# Patient Record
Sex: FEMALE | Race: WHITE | NOT HISPANIC OR LATINO | Employment: FULL TIME | URBAN - METROPOLITAN AREA
[De-identification: names, ages, dates, MRNs, and addresses within clinical notes are randomized per-mention and may not be internally consistent; named-entity substitution may affect disease eponyms.]

---

## 2018-09-14 ENCOUNTER — OFFICE VISIT - HEALTHEAST (OUTPATIENT)
Dept: FAMILY MEDICINE | Facility: CLINIC | Age: 53
End: 2018-09-14

## 2018-09-14 ENCOUNTER — COMMUNICATION - HEALTHEAST (OUTPATIENT)
Dept: SCHEDULING | Facility: CLINIC | Age: 53
End: 2018-09-14

## 2018-09-14 DIAGNOSIS — A08.4 VIRAL GASTROENTERITIS: ICD-10-CM

## 2018-09-14 DIAGNOSIS — K62.5 ANAL BLEEDING: ICD-10-CM

## 2018-09-14 DIAGNOSIS — A60.00 GENITAL HERPES: ICD-10-CM

## 2018-09-14 DIAGNOSIS — R10.13 ABDOMINAL PAIN, EPIGASTRIC: ICD-10-CM

## 2021-06-01 VITALS — WEIGHT: 133 LBS

## 2021-06-02 ENCOUNTER — RECORDS - HEALTHEAST (OUTPATIENT)
Dept: ADMINISTRATIVE | Facility: CLINIC | Age: 56
End: 2021-06-02

## 2021-06-20 NOTE — PROGRESS NOTES
Assessment:     1. Anal bleeding     2. Abdominal pain, epigastric  C. difficile Toxigenic by PCR    H. pylori Antigen, Stool    Culture, Stool   3. Genital herpes  acyclovir (ZOVIRAX) 400 MG tablet   4. Viral gastroenteritis            Plan:     Differential diagnosis include but not limited to bleeding, hemorrhoids, abdominal pain, viral gastroenteritis, genital herpes.  Discussed with the patient about internal bleeding, there is a possibility that she does have internal bleeding hemorrhoids.  Currently I am not concerned with this because the last time she went to the bathroom she did not notice any rectal bleeding.  Advised patient to keep an eye monitor.  Patient declined a rectal exam at this time she will monitor and if she continues to experience symptoms she was scheduled an appointment to follow-up with PCP.  For the abdominal pain and epigastric pain I suspect viral gastroenteritis.  I discussed with the patient to increase fluid intake.  If this is something that is viral she should expect it to subside within the next 24-48 hours.  She also has a stool keep to collect stool culture and return to clinic.  Patient also requesting for a prophylactic medication for genital herpes which is a cycloplegia, I did refill patient medication ×1 month.  She will follow-up with PCP the next available appointment.  Patient verbalized understanding the plan of care.    Subjective:       53 y.o. female presents for evaluation of abdominal pain and cramping that started about midnight last night.  Patient reports that the cramping stat in the mid epigastric and radiates to the back.  She also reports that chills and sometimes she feels very hard and when the cramps subsides the chills subsides also.  She also noticed some blood in the stool a few times this morning but this afternoon when she went to the bathroom she did not notice any blood.  She denies any history of rectal bleeding, she reports that the blood was  bright red.  She reports that she had a colonoscopy in January 2018 and it showed internal a hemorrhoids.  Other than that they have never bothered her.    The following portions of the patient's history were reviewed and updated as appropriate: allergies, current medications, past family history, past medical history, past social history, past surgical history and problem list.    Review of Systems  A 12 point comprehensive review of systems was negative except as noted.     Objective:      /84  Temp 98  F (36.7  C) (Oral)   Wt 133 lb (60.3 kg)  General appearance: alert, appears stated age, cooperative and mild distress  Head: Normocephalic, without obvious abnormality, atraumatic, sinuses nontender to percussion  Eyes: conjunctivae/corneas clear. PERRL, EOM's intact. Fundi benign.  Ears: normal TM's and external ear canals both ears  Nose: Nares normal. Septum midline. Mucosa normal. No drainage or sinus tenderness.  Throat: lips, mucosa, and tongue normal; teeth and gums normal  Lungs: clear to auscultation bilaterally  Heart: regular rate and rhythm, S1, S2 normal, no murmur, click, rub or gallop  Abdomen: abnormal findings:  moderate tenderness in the entire abdomen and pronounced in epigastric area  Extremities: extremities normal, atraumatic, no cyanosis or edema  Pulses: 2+ and symmetric  Skin: Skin color, texture, turgor normal. No rashes or lesions  Lymph nodes: Cervical, supraclavicular, and axillary nodes normal.  Neurologic: Grossly normal     This note has been dictated using voice recognition software. Any grammatical or context distortions are unintentional and inherent to the software

## 2021-07-21 ENCOUNTER — RECORDS - HEALTHEAST (OUTPATIENT)
Dept: ADMINISTRATIVE | Facility: CLINIC | Age: 56
End: 2021-07-21

## 2022-08-04 ENCOUNTER — ALLIED HEALTH/NURSE VISIT (OUTPATIENT)
Dept: FAMILY MEDICINE | Facility: CLINIC | Age: 57
End: 2022-08-04
Payer: COMMERCIAL

## 2022-08-04 VITALS — HEART RATE: 88 BPM | DIASTOLIC BLOOD PRESSURE: 60 MMHG | SYSTOLIC BLOOD PRESSURE: 97 MMHG | OXYGEN SATURATION: 97 %

## 2022-08-04 DIAGNOSIS — Z01.30 BLOOD PRESSURE CHECK: ICD-10-CM

## 2022-08-04 DIAGNOSIS — R09.89 CHEST CONGESTION: Primary | ICD-10-CM

## 2022-08-04 PROCEDURE — 99207 PR NO CHARGE NURSE ONLY: CPT

## 2022-08-04 NOTE — PROGRESS NOTES
Pt presented to the clinic c/o chest congestion.  She states that in the past she has had pneumonia and her sx feel the same.  She would like to be seen.  Informed pt that we are not an Urgent Care and there are no appts available today.  There are no available appts tomorrow either but pt is instructed to call in the morning to see if there is something available at that time.  Pt voiced understanding and also voiced understanding that if she starts to feel worse that she should seek care at the ER.

## 2022-08-05 ENCOUNTER — OFFICE VISIT (OUTPATIENT)
Dept: FAMILY MEDICINE | Facility: CLINIC | Age: 57
End: 2022-08-05
Payer: COMMERCIAL

## 2022-08-05 VITALS
SYSTOLIC BLOOD PRESSURE: 108 MMHG | DIASTOLIC BLOOD PRESSURE: 72 MMHG | WEIGHT: 131 LBS | HEART RATE: 90 BPM | TEMPERATURE: 97.8 F | OXYGEN SATURATION: 96 %

## 2022-08-05 DIAGNOSIS — J15.9 BACTERIAL PNEUMONIA: Primary | ICD-10-CM

## 2022-08-05 DIAGNOSIS — Z13.220 SCREENING CHOLESTEROL LEVEL: ICD-10-CM

## 2022-08-05 DIAGNOSIS — F17.219 CIGARETTE NICOTINE DEPENDENCE WITH NICOTINE-INDUCED DISORDER: ICD-10-CM

## 2022-08-05 PROCEDURE — 99213 OFFICE O/P EST LOW 20 MIN: CPT | Performed by: FAMILY MEDICINE

## 2022-08-05 RX ORDER — LEVOFLOXACIN 500 MG/1
500 TABLET, FILM COATED ORAL DAILY
Qty: 10 TABLET | Refills: 0 | Status: ON HOLD | OUTPATIENT
Start: 2022-08-05 | End: 2022-08-15

## 2022-08-05 NOTE — PROGRESS NOTES
Assessment & Plan     Bacterial pneumonia  Patient with findings consistent with recurrence of her pneumonia.  She is due for follow-up CAT scan for the previous CT.  We will get sputum cultures on her and started on Levaquin.  Follow-up after the CT is done sooner if worse in any way  - levofloxacin (LEVAQUIN) 500 MG tablet; Take 1 tablet (500 mg) by mouth daily for 10 days  - CT Chest w/o Contrast; Future  - Respiratory Aerobic Bacterial Culture; Future    Cigarette nicotine dependence with nicotine-induced disorder  We discussed Chantix and its use.  She needs a follow-up after CAT scan done and will look at prescribing it at that time.    Screening cholesterol level                     No follow-ups on file.    Kota Mitchell MD  [facility]    Ham Delaney is a 56 year old, presenting for the following health issues: Patient notes she has recurrence of her symptoms back again she notes over the last few days she started having a cough productive of foul sputum no bloody sputum she has had a pleuritic right sided chest pain which is why she presented when she had a pneumonia few months ago.  She is due for follow-up CAT scan.  She is a smoker.  She does desire to quit.  She has had no fevers chills or sweats no increased shortness of breath no lightheadedness dizziness no cold symptoms otherwise.  URI      URI    History of Present Illness       Reason for visit:  Pain in right side  Symptom onset:  1-3 days ago    She eats 2-3 servings of fruits and vegetables daily.She consumes 0 sweetened beverage(s) daily.She exercises with enough effort to increase her heart rate 9 or less minutes per day.  She exercises with enough effort to increase her heart rate 3 or less days per week.   She is taking medications regularly.     Last dx with pneumonia in May. Sx did resolve. No fevers.     Discuss rx for Chantix.  EXAM: CT ANGIO CHEST W IV CONT PE STUDY   LOCATION: [facility]   DATE/TIME: 5/13/2022 2:47 PM     INDICATION: Hemoptysis; tachycardia; cough; elevated d-dimer   COMPARISON: None.   TECHNIQUE: CT chest pulmonary angiogram during arterial phase injection of IV contrast. Multiplanar reformats and MIP reconstructions were performed. Dose reduction techniques were used.   CONTRAST: IOHEXOL 350 MG/ML IV SOLN 80 mL     FINDINGS:   ANGIOGRAM CHEST: Pulmonary arteries are normal caliber and negative for pulmonary emboli. Thoracic aorta is negative for dissection. No CT evidence of right heart strain.     LUNGS AND PLEURA: Multiple consolidations in the lungs and mostly in the lower lobes. These have an infectious or inflammatory appearance. Septic emboli could be considered, however these do not have the typical appearance. No pleural effusions. Mild apical predominant emphysema.     MEDIASTINUM/AXILLAE: Mediastinal and bilateral hilar lymphadenopathy suggests reactive changes. Normal heart size.     CORONARY ARTERY CALCIFICATION: None.     UPPER ABDOMEN: Normal.     MUSCULOSKELETAL: Normal.     IMPRESSION:   1.  Negative for pulmonary emboli.   2.  Multiple small consolidations in both lungs suggesting a multifocal pneumonia. Septic emboli should be considered, however no cavitary lesions are evident. Atypical infections should also be considered such as fungal. Recommend a follow-up CT in one month to reevaluate.   3.  Mediastinal and bilateral hilar lymphadenopathy.    Procedure Note    Charles Garcia MD - 05/13/2022   Formatting of this note might be different from the original.   EXAM: CT ANGIO CHEST W IV CONT PE STUDY   LOCATION: [facility]   DATE/TIME: 5/13/2022 2:47 PM     INDICATION: Hemoptysis; tachycardia; cough; elevated d-dimer   COMPARISON: None.   TECHNIQUE: CT chest pulmonary angiogram during arterial phase injection of IV contrast. Multiplanar reformats and MIP reconstructions were performed. Dose reduction techniques were used.    CONTRAST: IOHEXOL 350 MG/ML IV SOLN 80 mL     FINDINGS:   ANGIOGRAM CHEST: Pulmonary arteries are normal caliber and negative for pulmonary emboli. Thoracic aorta is negative for dissection. No CT evidence of right heart strain.     LUNGS AND PLEURA: Multiple consolidations in the lungs and mostly in the lower lobes. These have an infectious or inflammatory appearance. Septic emboli could be considered, however these do not have the typical appearance. No pleural effusions. Mild apical predominant emphysema.     MEDIASTINUM/AXILLAE: Mediastinal and bilateral hilar lymphadenopathy suggests reactive changes. Normal heart size.     CORONARY ARTERY CALCIFICATION: None.     UPPER ABDOMEN: Normal.     MUSCULOSKELETAL: Normal.     IMPRESSION:   1.  Negative for pulmonary emboli.   2.  Multiple small consolidations in both lungs suggesting a multifocal pneumonia. Septic emboli should be considered, however no cavitary lesions are evident. Atypical infections should also be considered such as fungal. Recommend a follow-up CT in one month to reevaluate.   3.  Mediastinal and bilateral hilar lymphadenopathy.      Review of Systems   Constitutional, HEENT, cardiovascular, pulmonary, gi and gu systems are negative, except as otherwise noted.      Objective    /72 (BP Location: Right arm, Patient Position: Sitting, Cuff Size: Adult Regular)   Pulse 90   Temp 97.8  F (36.6  C) (Tympanic)   Wt 59.4 kg (131 lb)   SpO2 96%   There is no height or weight on file to calculate BMI.  Physical Exam   GENERAL: healthy, alert and no distress  NECK: no adenopathy, no asymmetry, masses, or scars and thyroid normal to palpation  RESP: She has right basilar and mid field rales  CV: regular rate and rhythm, normal S1 S2, no S3 or S4, no murmur, click or rub, no peripheral edema and peripheral pulses strong  ABDOMEN: soft, nontender, no hepatosplenomegaly, no masses and bowel sounds normal  MS: no gross musculoskeletal defects noted,  no edema                    .  ..

## 2022-08-08 ENCOUNTER — HOSPITAL ENCOUNTER (OUTPATIENT)
Dept: CT IMAGING | Facility: CLINIC | Age: 57
Discharge: HOME | End: 2022-08-08
Attending: FAMILY MEDICINE | Admitting: FAMILY MEDICINE
Payer: COMMERCIAL

## 2022-08-08 DIAGNOSIS — J15.9 BACTERIAL PNEUMONIA: ICD-10-CM

## 2022-08-08 PROCEDURE — 71250 CT THORAX DX C-: CPT

## 2022-08-09 ENCOUNTER — TELEPHONE (OUTPATIENT)
Dept: FAMILY MEDICINE | Facility: CLINIC | Age: 57
End: 2022-08-09

## 2022-08-09 DIAGNOSIS — J15.9 BACTERIAL PNEUMONIA: Primary | ICD-10-CM

## 2022-08-09 NOTE — TELEPHONE ENCOUNTER
----- Message from Kota Mitchell MD sent at 8/8/2022 11:11 AM CDT -----  Let her know ct shows pneumonia and possible fluid collection  Needs referral to pulmonary med for recurrent pneumonia and empyema Hopefully within a few weeks

## 2022-08-10 ENCOUNTER — HOSPITAL ENCOUNTER (INPATIENT)
Facility: HOSPITAL | Age: 57
LOS: 5 days | Discharge: HOME | DRG: 163 | End: 2022-08-15
Attending: EMERGENCY MEDICINE | Admitting: INTERNAL MEDICINE
Payer: COMMERCIAL

## 2022-08-10 ENCOUNTER — TELEPHONE (OUTPATIENT)
Dept: FAMILY MEDICINE | Facility: CLINIC | Age: 57
End: 2022-08-10

## 2022-08-10 DIAGNOSIS — J86.9 EMPYEMA (H): ICD-10-CM

## 2022-08-10 DIAGNOSIS — Z78.9 FAILURE OF OUTPATIENT TREATMENT: ICD-10-CM

## 2022-08-10 DIAGNOSIS — J18.9 HCAP (HEALTHCARE-ASSOCIATED PNEUMONIA): ICD-10-CM

## 2022-08-10 DIAGNOSIS — J18.9 PNEUMONIA OF RIGHT LOWER LOBE DUE TO INFECTIOUS ORGANISM: Primary | ICD-10-CM

## 2022-08-10 LAB
ANION GAP SERPL CALCULATED.3IONS-SCNC: 9 MMOL/L (ref 5–18)
BUN SERPL-MCNC: 7 MG/DL (ref 8–22)
CALCIUM SERPL-MCNC: 9.4 MG/DL (ref 8.5–10.5)
CHLORIDE BLD-SCNC: 97 MMOL/L (ref 98–107)
CO2 SERPL-SCNC: 30 MMOL/L (ref 22–31)
CREAT SERPL-MCNC: 0.63 MG/DL (ref 0.6–1.1)
ERYTHROCYTE [DISTWIDTH] IN BLOOD BY AUTOMATED COUNT: 14 % (ref 10–15)
GFR SERPL CREATININE-BSD FRML MDRD: >90 ML/MIN/1.73M2
GLUCOSE BLD-MCNC: 97 MG/DL (ref 70–125)
HCT VFR BLD AUTO: 38.8 % (ref 35–47)
HGB BLD-MCNC: 12.7 G/DL (ref 11.7–15.7)
LACTATE SERPL-SCNC: 1.3 MMOL/L (ref 0.7–2)
MAGNESIUM SERPL-MCNC: 1.9 MG/DL (ref 1.8–2.6)
MCH RBC QN AUTO: 28.7 PG (ref 26.5–33)
MCHC RBC AUTO-ENTMCNC: 32.7 G/DL (ref 31.5–36.5)
MCV RBC AUTO: 88 FL (ref 78–100)
PLATELET # BLD AUTO: 490 10E3/UL (ref 150–450)
POTASSIUM BLD-SCNC: 3.9 MMOL/L (ref 3.5–5)
RBC # BLD AUTO: 4.42 10E6/UL (ref 3.8–5.2)
SARS-COV-2 RNA RESP QL NAA+PROBE: NEGATIVE
SODIUM SERPL-SCNC: 136 MMOL/L (ref 136–145)
WBC # BLD AUTO: 15.9 10E3/UL (ref 4–11)

## 2022-08-10 PROCEDURE — 120N000001 HC R&B MED SURG/OB

## 2022-08-10 PROCEDURE — 99223 1ST HOSP IP/OBS HIGH 75: CPT | Performed by: INTERNAL MEDICINE

## 2022-08-10 PROCEDURE — 85027 COMPLETE CBC AUTOMATED: CPT | Performed by: STUDENT IN AN ORGANIZED HEALTH CARE EDUCATION/TRAINING PROGRAM

## 2022-08-10 PROCEDURE — U0005 INFEC AGEN DETEC AMPLI PROBE: HCPCS | Performed by: STUDENT IN AN ORGANIZED HEALTH CARE EDUCATION/TRAINING PROGRAM

## 2022-08-10 PROCEDURE — 96365 THER/PROPH/DIAG IV INF INIT: CPT

## 2022-08-10 PROCEDURE — 83605 ASSAY OF LACTIC ACID: CPT | Performed by: STUDENT IN AN ORGANIZED HEALTH CARE EDUCATION/TRAINING PROGRAM

## 2022-08-10 PROCEDURE — C9803 HOPD COVID-19 SPEC COLLECT: HCPCS

## 2022-08-10 PROCEDURE — 87040 BLOOD CULTURE FOR BACTERIA: CPT | Performed by: INTERNAL MEDICINE

## 2022-08-10 PROCEDURE — 999N000157 HC STATISTIC RCP TIME EA 10 MIN

## 2022-08-10 PROCEDURE — 250N000011 HC RX IP 250 OP 636: Performed by: NURSE PRACTITIONER

## 2022-08-10 PROCEDURE — 36415 COLL VENOUS BLD VENIPUNCTURE: CPT | Performed by: STUDENT IN AN ORGANIZED HEALTH CARE EDUCATION/TRAINING PROGRAM

## 2022-08-10 PROCEDURE — 87040 BLOOD CULTURE FOR BACTERIA: CPT | Performed by: STUDENT IN AN ORGANIZED HEALTH CARE EDUCATION/TRAINING PROGRAM

## 2022-08-10 PROCEDURE — 250N000011 HC RX IP 250 OP 636: Performed by: INTERNAL MEDICINE

## 2022-08-10 PROCEDURE — 99222 1ST HOSP IP/OBS MODERATE 55: CPT | Performed by: INTERNAL MEDICINE

## 2022-08-10 PROCEDURE — 94640 AIRWAY INHALATION TREATMENT: CPT

## 2022-08-10 PROCEDURE — 80048 BASIC METABOLIC PNL TOTAL CA: CPT | Performed by: STUDENT IN AN ORGANIZED HEALTH CARE EDUCATION/TRAINING PROGRAM

## 2022-08-10 PROCEDURE — 96367 TX/PROPH/DG ADDL SEQ IV INF: CPT

## 2022-08-10 PROCEDURE — 36415 COLL VENOUS BLD VENIPUNCTURE: CPT | Performed by: INTERNAL MEDICINE

## 2022-08-10 PROCEDURE — 83735 ASSAY OF MAGNESIUM: CPT | Performed by: INTERNAL MEDICINE

## 2022-08-10 PROCEDURE — 99285 EMERGENCY DEPT VISIT HI MDM: CPT | Mod: 25

## 2022-08-10 PROCEDURE — 250N000009 HC RX 250: Performed by: INTERNAL MEDICINE

## 2022-08-10 RX ORDER — SODIUM CHLORIDE FOR INHALATION 3 %
3 VIAL, NEBULIZER (ML) INHALATION ONCE
Status: COMPLETED | OUTPATIENT
Start: 2022-08-10 | End: 2022-08-10

## 2022-08-10 RX ORDER — VANCOMYCIN HYDROCHLORIDE 1 G/200ML
1000 INJECTION, SOLUTION INTRAVENOUS EVERY 12 HOURS
Status: DISCONTINUED | OUTPATIENT
Start: 2022-08-10 | End: 2022-08-12

## 2022-08-10 RX ORDER — NICOTINE 21 MG/24HR
1 PATCH, TRANSDERMAL 24 HOURS TRANSDERMAL DAILY PRN
Status: DISCONTINUED | OUTPATIENT
Start: 2022-08-10 | End: 2022-08-15 | Stop reason: HOSPADM

## 2022-08-10 RX ORDER — HEPARIN SODIUM 5000 [USP'U]/.5ML
5000 INJECTION, SOLUTION INTRAVENOUS; SUBCUTANEOUS EVERY 12 HOURS
Status: COMPLETED | OUTPATIENT
Start: 2022-08-11 | End: 2022-08-11

## 2022-08-10 RX ORDER — DOXYCYCLINE 100 MG/10ML
100 INJECTION, POWDER, LYOPHILIZED, FOR SOLUTION INTRAVENOUS ONCE
Status: COMPLETED | OUTPATIENT
Start: 2022-08-10 | End: 2022-08-10

## 2022-08-10 RX ORDER — LIDOCAINE 40 MG/G
CREAM TOPICAL
Status: DISCONTINUED | OUTPATIENT
Start: 2022-08-10 | End: 2022-08-13

## 2022-08-10 RX ORDER — ACETAMINOPHEN 325 MG/1
325 TABLET ORAL EVERY 4 HOURS PRN
Status: DISCONTINUED | OUTPATIENT
Start: 2022-08-10 | End: 2022-08-15 | Stop reason: HOSPADM

## 2022-08-10 RX ORDER — DOXYCYCLINE 100 MG/10ML
100 INJECTION, POWDER, LYOPHILIZED, FOR SOLUTION INTRAVENOUS EVERY 12 HOURS
Status: DISCONTINUED | OUTPATIENT
Start: 2022-08-11 | End: 2022-08-10

## 2022-08-10 RX ADMIN — DOXYCYCLINE 100 MG: 100 INJECTION, POWDER, LYOPHILIZED, FOR SOLUTION INTRAVENOUS at 14:36

## 2022-08-10 RX ADMIN — CEFEPIME 2 G: 2 INJECTION, POWDER, FOR SOLUTION INTRAVENOUS at 22:30

## 2022-08-10 RX ADMIN — SODIUM CHLORIDE SOLN NEBU 3% 3 ML: 3 NEBU SOLN at 20:04

## 2022-08-10 RX ADMIN — VANCOMYCIN HYDROCHLORIDE 1000 MG: 1 INJECTION, SOLUTION INTRAVENOUS at 18:38

## 2022-08-10 RX ADMIN — CEFEPIME HYDROCHLORIDE 2 G: 2 INJECTION, POWDER, FOR SOLUTION INTRAVENOUS at 13:55

## 2022-08-10 ASSESSMENT — ACTIVITIES OF DAILY LIVING (ADL)
CONCENTRATING,_REMEMBERING_OR_MAKING_DECISIONS_DIFFICULTY: NO
ADLS_ACUITY_SCORE: 20
ADLS_ACUITY_SCORE: 35
CHANGE_IN_FUNCTIONAL_STATUS_SINCE_ONSET_OF_CURRENT_ILLNESS/INJURY: NO
TOILETING_ISSUES: NO
ADLS_ACUITY_SCORE: 35
DOING_ERRANDS_INDEPENDENTLY_DIFFICULTY: NO
ADLS_ACUITY_SCORE: 20
WALKING_OR_CLIMBING_STAIRS_DIFFICULTY: NO
DRESSING/BATHING_DIFFICULTY: NO
FALL_HISTORY_WITHIN_LAST_SIX_MONTHS: NO
DIFFICULTY_EATING/SWALLOWING: NO
ADLS_ACUITY_SCORE: 33
ADLS_ACUITY_SCORE: 20
WEAR_GLASSES_OR_BLIND: YES
VISION_MANAGEMENT: GLASSES

## 2022-08-10 ASSESSMENT — ENCOUNTER SYMPTOMS
COUGH: 1
NAUSEA: 0
SHORTNESS OF BREATH: 0
DIARRHEA: 0
FEVER: 0
VOMITING: 0

## 2022-08-10 NOTE — ED NOTES
Emergency Department LATRICE Supervisory Note     I had a face to face encounter with this patient who was also seen by the LATRICE (PA / NP) who is currently serving as a LATRICE provider in the Emergency Department. I have seen, examined, and discussed the patient with the LATRICE and agree with their assessment and plan of management. Please refer to the LATRICE note for further details and ED course.     Brief HPI:     Elaina Charles is a 56 year old female who presents for evaluation of chest pain.    The patient has a history of recurring pneumonia. She last had pneumonia in May. She was given a course of antibiotics and felt better. Last Monday (8/1/2022, 9 days ago), the patient developed chest pain while taking deep breaths, which has persisted since. On Friday (8/5) and Saturday (8/6), the patient notes that she had a significant cough. Today, the patient has the same chest pain, but feels otherwise normal. No other complaints at this time.    I, Lucía Ludwig, am serving as a scribe to document services personally performed by Travis Thompson MD, based on my observations and the provider's statements to me. I, Travis Thompson MD, attest that Lucía Ludwig was acting in a scribe capacity, has observed my performance of the services and has documented them in accordance with my direction.    Brief Review of Systems:  Review of Systems   Respiratory: Positive for cough (resolved).    Cardiovascular: Positive for chest pain.   All other systems reviewed and are negative.         Brief Physical Exam:  Physical Exam  Vitals and nursing note reviewed.   HENT:      Head: Normocephalic.      Nose: Nose normal.   Cardiovascular:      Rate and Rhythm: Normal rate.   Pulmonary:      Effort: Pulmonary effort is normal.   Neurological:      Mental Status: She is alert. Mental status is at baseline.   Psychiatric:         Mood and Affect: Mood normal.         ED Course/MDM:  1:54 PM Elaina Charles was staffed with me. I agree with their assessment and  plan of management, and I will see the patient.  I met with the patient to introduce myself, gather additional history, perform my initial exam, and discuss the plan.     I did take the call on this patient.  She had a hospitalization back in May.  She developed pneumonia over the last few weeks and has failed a couple of courses of outpatient antibiotics.  Overall worsening, imaging today, Noncon CT shows empyema with some gas as well as evidence for active pneumonia.  She has a lot of antibiotic allergies and after consulting with pharmacy we will start her on cefepime and doxycycline.  Plan is for admission.  She is stable updated and in agreement           1. HCAP (healthcare-associated pneumonia)    2. Empyema (H)    3. Failure of outpatient treatment        Labs and Imaging:  Results for orders placed or performed during the hospital encounter of 08/10/22   CBC (+ platelets, no diff)   Result Value Ref Range    WBC Count 15.9 (H) 4.0 - 11.0 10e3/uL    RBC Count 4.42 3.80 - 5.20 10e6/uL    Hemoglobin 12.7 11.7 - 15.7 g/dL    Hematocrit 38.8 35.0 - 47.0 %    MCV 88 78 - 100 fL    MCH 28.7 26.5 - 33.0 pg    MCHC 32.7 31.5 - 36.5 g/dL    RDW 14.0 10.0 - 15.0 %    Platelet Count 490 (H) 150 - 450 10e3/uL   Basic metabolic panel   Result Value Ref Range    Sodium 136 136 - 145 mmol/L    Potassium 3.9 3.5 - 5.0 mmol/L    Chloride 97 (L) 98 - 107 mmol/L    Carbon Dioxide (CO2) 30 22 - 31 mmol/L    Anion Gap 9 5 - 18 mmol/L    Urea Nitrogen 7 (L) 8 - 22 mg/dL    Creatinine 0.63 0.60 - 1.10 mg/dL    Calcium 9.4 8.5 - 10.5 mg/dL    Glucose 97 70 - 125 mg/dL    GFR Estimate >90 >60 mL/min/1.73m2   Lactic acid whole blood   Result Value Ref Range    Lactic Acid 1.3 0.7 - 2.0 mmol/L   Asymptomatic COVID-19 Virus (Coronavirus) by PCR Nasopharyngeal    Specimen: Nasopharyngeal; Swab   Result Value Ref Range    SARS CoV2 PCR Negative Negative     I have reviewed the relevant laboratory and radiology studies      Travis Thompson  MD  [facility] EMERGENCY DEPARTMENT  [facility] [facility] [address]  [phone]     Travis Thompson MD  08/10/22 1505

## 2022-08-10 NOTE — ED NOTES
"ED Triage Provider Note  [facility]  Encounter Date: Aug 10, 2022    History:  No chief complaint on file.    Elaina Charles is a 56 year old female who presents to the ED with cough. Has been treated for outpatient pneumonia with worsening symptoms, outpatient CT concerning for empyema/multifocal pneumonia. No fevers. Continues to have mild cough. No vomiting or diarrhea. Pain with deep breaths.     Review of Systems:  Review of Systems   Constitutional: Negative for fever.   Respiratory: Positive for cough.    All other systems reviewed and are negative.       Exam:  /72   Pulse 103   Temp 99.9  F (37.7  C) (Oral)   Resp 18   Ht 1.575 m (5' 2\")   Wt 59.4 kg (131 lb)   SpO2 93%   BMI 23.96 kg/m    General: No acute distress. Appears stated age.   Cardio: Regular rate, extremities well perfused  Resp: Normal work of breathing, grossly normal respiratory rate  Neuro: Alert. CN II-XII grossly intact. Grossly intact strength.   Skin: warm, dry    Medical Decision Making:  Patient arriving to the ED with problem as above. A medical screening exam was performed. Plan for IV, labs, covid swab. The patient is appropriate to wait in triage.    Interventions:  Medications - No data to display  Orders Placed This Encounter     CBC (+ platelets, no diff)     Basic metabolic panel     Lactic acid whole blood     Asymptomatic COVID-19 Virus (Coronavirus) by PCR     Diagnosis:  Krysten Rizzo MD  08/10/22 1144    "

## 2022-08-10 NOTE — ED NOTES
Coming from [facility],     Has history of long-term smoking.  It was recently treated as an outpatient for pneumonia and feels worse.  CT noncontrast a couple of days ago just got resulted and shows empyema with multilobar pneumonia.  Patient is coming from home.  Apparently has been fairly stable     Travis Thompson MD  08/10/22 1105

## 2022-08-10 NOTE — PHARMACY-VANCOMYCIN DOSING SERVICE
Pharmacy Vancomycin Initial Note  Date of Service August 10, 2022  Patient's  1965  56 year old, female    Indication: empyema     Current estimated CrCl = Estimated Creatinine Clearance: 94.6 mL/min (based on SCr of 0.63 mg/dL).    Creatinine for last 3 days  8/10/2022:  1:10 PM Creatinine 0.63 mg/dL    Recent Vancomycin Level(s) for last 3 days  No results found for requested labs within last 72 hours.      Vancomycin IV Administrations (past 72 hours)      No vancomycin orders with administrations in past 72 hours.                Nephrotoxins and other renal medications (From now, onward)    Start     Dose/Rate Route Frequency Ordered Stop    08/10/22 1900  vancomycin (VANCOCIN) 1000 mg in dextrose 5% 200 mL PREMIX         1,000 mg  200 mL/hr over 1 Hours Intravenous EVERY 12 HOURS 08/10/22 1740            Contrast Orders - past 72 hours (72h ago, onward)    None          [employer] Prediction of Planned Initial Vancomycin Regimen  Loading dose: N/A  Regimen: 1000 mg IV every 12 hours.  Start time: 17:51 on 08/10/2022  Exposure target: AUC24 (range)400-600 mg/L.hr   AUC24,ss: 529 mg/L.hr  Probability of AUC24 > 400: 78 %  Ctrough,ss: 15.8 mg/L  Probability of Ctrough,ss > 20: 31 %  Probability of nephrotoxicity (Lodise MOISES ): 11 %          Plan:  1. Start vancomycin  1000 mg IV q12h.   2. Vancomycin monitoring method: AUC  3. Vancomycin therapeutic monitoring goal: 400-600 mg*h/L  4. Pharmacy will check vancomycin levels as appropriate in 1-3 Days.        Kalyani Sierra, [facility]

## 2022-08-10 NOTE — ED PROVIDER NOTES
EMERGENCY DEPARTMENT ENCOUNTER      NAME: Elaina Charles  AGE: 56 year old female  YOB: 1965  MRN: [id number]  EVALUATION DATE & TIME: 8/10/2022 12:52 PM    PCP: No Ref-Primary, Physician    ED PROVIDER: KIT Ceja CNP      Chief Complaint   Patient presents with     Cough         FINAL IMPRESSION:  1. HCAP (healthcare-associated pneumonia)    2. Empyema (H)    3. Failure of outpatient treatment          ED COURSE & MEDICAL DECISION MAKIN:09 PM I met with the patient, obtained history, performed an initial exam, and discussed options and plan for treatment here in the ED.  1:36 PM Staffed patient with Dr. Thompson.   2:54 PM Checked in on and updated patient.   3:33 PM Spoke to hospitalist, Dr. Scales, who accepts the patient.  4:28 PM case discussed with Dr Delgado - intensivist.    Pertinent Labs & Imaging studies reviewed. (See chart for details)  56 year old female presents to the Emergency Department for evaluation of recurrent pneumonia. Outpatient chest CT from 2 days ago shows right sided PNA with parapneumonic effusion lung suggesting empyema.  She does complain of some pleuritic pain on the right side.  Had noted some shortness of breath a few days ago but this has resolved.  She appears well.  However, does have failure of outpatient management with ongoing pneumonia/empyema.  Currently no evidence for sepsis.  Blood cultures drawn and pending.  Ordered cefepime and doxycycline in the ED. Pulmonology aware.    At the conclusion of the encounter I discussed the results of all of the tests and the disposition. The questions were answered. The patient or family acknowledged understanding and was agreeable with the care plan.     MEDICATIONS GIVEN IN THE EMERGENCY:  Medications   ceFEPIme (MAXIPIME) 2 g vial to attach to  ml bag for ADULTS or 50 ml bag for PEDS (0 g Intravenous Stopped 8/10/22 1432)   doxycycline (VIBRAMYCIN) 100 mg vial to attach to  mL bag (0 mg  Intravenous Stopped 8/10/22 1600)       NEW PRESCRIPTIONS STARTED AT TODAY'S ER VISIT  New Prescriptions    No medications on file            =================================================================    HPI    Patient information was obtained from: patient     Use of Intrepreter: N/A        Elaina Charles is a 56 year old female with no medical history on file who presents to the ED by walk in for evaluation of chest pain.    The patient has a history of recurring pneumonia. She last had pneumonia in May. She was given a course of antibiotics and felt better. Last Monday (8/1/2022, 9 days ago), the patient developed chest pain while taking deep breaths, which has persisted since. On Friday (8/5) and Saturday (8/6), the patient notes that she had a significant cough. Today, the patient has the same chest pain, but feels otherwise normal. The patient endorses smoking. She denies shortness of breath, fever, nausea, vomiting, diarrhea, or any other complaints at this time.     REVIEW OF SYSTEMS   Review of Systems   Constitutional: Negative for fever.   Respiratory: Positive for cough (resolved). Negative for shortness of breath.    Cardiovascular: Positive for chest pain (while taking deep breaths).   Gastrointestinal: Negative for diarrhea, nausea and vomiting.   All other systems reviewed and are negative.       PAST MEDICAL HISTORY:  History reviewed. No pertinent past medical history.    PAST SURGICAL HISTORY:  History reviewed. No pertinent surgical history.        CURRENT MEDICATIONS:    No current facility-administered medications for this encounter.     Current Outpatient Medications   Medication     levofloxacin (LEVAQUIN) 500 MG tablet         ALLERGIES:  Allergies   Allergen Reactions     Amoxicillin Swelling and Rash     Occurred when she was 19 and upon rechallenge some years later         FAMILY HISTORY:  History reviewed. No pertinent family history.    SOCIAL HISTORY:   Social History  "    Socioeconomic History     Marital status:      Spouse name: None     Number of children: None     Years of education: None     Highest education level: None   Tobacco Use     Smoking status: Current Every Day Smoker     Smokeless tobacco: Never Used         VITALS:  Patient Vitals for the past 24 hrs:   BP Temp Temp src Pulse Resp SpO2 Height Weight   08/10/22 1530 117/64 -- -- 99 -- 92 % -- --   08/10/22 1515 117/62 -- -- 102 -- 92 % -- --   08/10/22 1500 107/59 -- -- 97 -- 92 % -- --   08/10/22 1445 113/61 -- -- 95 -- 93 % -- --   08/10/22 1439 111/58 -- -- 94 -- 94 % -- --   08/10/22 1400 122/70 -- -- 96 -- 93 % -- --   08/10/22 1143 125/72 99.9  F (37.7  C) Oral 103 18 93 % 1.575 m (5' 2\") 59.4 kg (131 lb)       PHYSICAL EXAM    Constitutional:  Well developed, well nourished, no acute distress  EYES: Conjunctivae clear  HENT:  Atraumatic, normocephalic  Respiratory:  No respiratory distress, diminished right-sided lung sounds  Cardiovascular:  Normal rate, normal rhythm, no murmurs  Musculoskeletal:  No edema.  No cyanosis  Integument: Warm, Dry  Neurologic:  Alert & oriented x 3              LAB:  All pertinent labs reviewed and interpreted.  Labs Ordered and Resulted from Time of ED Arrival to Time of ED Departure   CBC WITH PLATELETS - Abnormal       Result Value    WBC Count 15.9 (*)     RBC Count 4.42      Hemoglobin 12.7      Hematocrit 38.8      MCV 88      MCH 28.7      MCHC 32.7      RDW 14.0      Platelet Count 490 (*)    BASIC METABOLIC PANEL - Abnormal    Sodium 136      Potassium 3.9      Chloride 97 (*)     Carbon Dioxide (CO2) 30      Anion Gap 9      Urea Nitrogen 7 (*)     Creatinine 0.63      Calcium 9.4      Glucose 97      GFR Estimate >90     LACTIC ACID WHOLE BLOOD - Normal    Lactic Acid 1.3     COVID-19 VIRUS (CORONAVIRUS) BY PCR - Normal    SARS CoV2 PCR Negative     BLOOD CULTURE   BLOOD CULTURE   RESPIRATORY AEROBIC BACTERIAL CULTURE         RADIOLOGY:  Reviewed all " pertinent imaging. Please see official radiology report.  No orders to display             PROCEDURES:   None      I, Alvina Guillaume, am serving as a scribe to document services personally performed by Tex Patel CNP. based on my observation and the provider's statements to me. I, Tex Patel CNP attest that Alvina Guillaume is acting in a scribe capacity, has observed my performance of the services and has documented them in accordance with my direction.    KIT Ceja, CNP  Emergency Medicine  [facility] EMERGENCY DEPARTMENT  [address] [place] [id number]  [phone]  Dept: [phone]         Tex Patel APRN CNP  08/10/22 8449

## 2022-08-10 NOTE — ED NOTES
"[facility] ED Handoff Report    ED Chief Complaint: Cough    ED Diagnosis:  (J18.9) HCAP (healthcare-associated pneumonia)  Comment:   Plan:    (J86.9) Empyema (H)  Comment:   Plan:     (Z78.9) Failure of outpatient treatment  Comment:   Plan:        PMH:  History reviewed. No pertinent past medical history.     Code Status:  No Order     Falls Risk: No Band: Not applicable    Current Living Situation/Residence: lives alone     Elimination Status: Continent: Yes     Activity Level: SBA    Patients Preferred Language:  English     Needed: No    Vital Signs:  /64   Pulse 99   Temp 99.9  F (37.7  C) (Oral)   Resp 18   Ht 1.575 m (5' 2\")   Wt 59.4 kg (131 lb)   SpO2 92%   BMI 23.96 kg/m       Cardiac Rhythm:     Pain Score: 0/10    Is the Patient Confused:  No    Last Food or Drink: 08/10/22 at AM    Focused Assessment:  Pt with previous SOB, reports it is better now. Some pain with deep breathing. Frequent nonproductive cough. O2 low 90s on RA. Ambulatory with SBA. Continent. AOx4.    Tests Performed: Done: Labs and Imaging    Treatments Provided:  IV cefepime and doxycycline.     Family Dynamics/Concerns: No    Family Updated On Visitor Policy: Yes    Plan of Care Communicated to Family: Yes    Who Was Updated about Plan of Care: pt in contact with family    Belongings Checklist Done and Signed by Patient: Yes    Medications sent with patient: n/a    Covid: symptomatic, negative    Additional Information:     RN: Corky Angeles RN   8/10/2022 4:29 PM         "

## 2022-08-10 NOTE — ED TRIAGE NOTES
Pt has CT that showed possible empyema. Sent to be further evaluated. Denies shortness of breath. Pain with taking a deep breath.

## 2022-08-10 NOTE — CONSULTS
PULMONARY / CRITICAL CARE CONSULT NOTE     Date / Time of Admission:  8/10/2022 12:52 PM     Assessment:      Elaina Charles is a 56 year old female with history of tobacco use.   Patient was seen by PCP for ongoing right side chest pain, chills and difficulty breathing over the last week. Patient was previously treated for pneumonia on 5/2022.   In view of respiratory symptoms, she was scheduled for chest CT scan , study showed RLL infiltrate and small loculated right pleural effusion with pockets or air.   Pulmonary service consulted.     1. RLL pneumonia and empyema  No dysphagia. Denies outdoors activities. 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   Case was discussed with radiology, right loculated effusion is too small for chest tube placement.   Plan for thoracic surgery evaluation.   Continue Broad Abx. vancomycin and cefepime.   2. Small lung nodules   Follow up chest CT scan in 6 months   3. Tobacco user  Smoking cessation counseling     Advance Directives:  Full code     Plan:   1. Titrate FiO2, keep SpO2 > 90%  2. Thoracic surgery evaluation   3. Follow up blood cultures , get sputum Cx  4. Continue broad Abx cefepime and vancomycin  5. DVT prophylaxis heparin SQ     Please contact me if you have any questions.        Winston Serrano  Pulmonary / Critical Care  08/10/2022  5:38 PM        Reason for consult : empyema    HPI:  Elaina Charles is a 56 year old female with history of tobacco use.   Patient was seen by PCP for ongoing right side chest pain, chills and difficulty breathing over the last week. Patient was previously treated for pneumonia on 5/2022.   In view of respiratory symptoms, she was scheduled for chest CT scan , study showed RLL infiltrate and small loculated right pleural effusion with pockets or air.   Pulmonary service consulted.   Patient reports daily smoker > 1ppd, no recent trips. 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 Pets include a dog.     PMH  - S/p treatment  "for pneumonia 5/2022      Allergies: Amoxicillin      MEDS:  Current Facility-Administered Medications   Medication     acetaminophen (TYLENOL) tablet 325 mg     ceFEPIme (MAXIPIME) 2 g vial to attach to  ml bag for ADULTS or 50 ml bag for PEDS     lidocaine (LMX4) cream     lidocaine 1 % 0.1-1 mL     melatonin tablet 1 mg     nicotine (NICODERM CQ) 14 MG/24HR 24 hr patch 1 patch     nicotine Patch in Place     sodium chloride (PF) 0.9% PF flush 3 mL     sodium chloride (PF) 0.9% PF flush 3 mL     vancomycin (VANCOCIN) 1000 mg in dextrose 5% 200 mL PREMIX     Social History     Socioeconomic History     Marital status:      Spouse name: Not on file     Number of children: Not on file     Years of education: Not on file     Highest education level: Not on file   Occupational History     Not on file   Tobacco Use     Smoking status: Current Every Day Smoker     Smokeless tobacco: Never Used   Substance and Sexual Activity     Alcohol use: Not on file     Drug use: Not on file     Sexual activity: Not on file   Other Topics Concern     Not on file   Social History Narrative     Not on file     Social Determinants of Health     Financial Resource Strain: Not on file   Food Insecurity: Not on file   Transportation Needs: Not on file   Physical Activity: Not on file   Stress: Not on file   Social Connections: Not on file   Intimate Partner Violence: Not on file   Housing Stability: Not on file     History reviewed. No pertinent family history.    ROS  - Twelve point review of systems were discussed with patient. Positive findings in HPI.      Objective:   VITALS:  /60 (BP Location: Left arm, Patient Position: Chair)   Pulse 93   Temp 99.5  F (37.5  C) (Oral)   Resp 24   Ht 1.575 m (5' 2\")   Wt 59.4 kg (131 lb)   SpO2 90%   BMI 23.96 kg/m    VENT:  Resp: 24     EXAM:   Gen: awake, alert, mild distress due to chest pain  HEENT: pink conjunctiva, moist mucosa, Mallampati II/IV  Neck: no thyromegaly, " masses or JVD  Lungs: decrease breath sounds right base, discrete crackles right base  CV: regular, no murmurs or gallops appreciated  Abdomen: soft, NT, BS wnl  Ext: no edema  Neuro: CN II-XII intact, non focal         Data Review:      Recent Labs   Lab 08/10/22  1310   GLC 97          08/10/22 13:10   Sodium 136   Potassium 3.9   Chloride 97 (L)   Carbon Dioxide 30   Urea Nitrogen 7 (L)   Creatinine 0.63   GFR Estimate >90   Calcium 9.4   Anion Gap 9   Magnesium 1.9   Lactic Acid 1.3   Glucose 97   WBC 15.9 (H)   Hemoglobin 12.7   Hematocrit 38.8   Platelet Count 490 (H)   RBC Count 4.42   MCV 88   MCH 28.7   MCHC 32.7   RDW 14.0     CT CHEST W/O CONTRAST  LOCATION: [facility]  DATE/TIME: 8/8/2022 5:53 AM  INDICATION:  Bacterial pneumonia  COMPARISON: None.  FINDINGS:   LUNGS AND PLEURA: Right middle and lower lobe consolidation with air bronchograms compatible with pneumonia. Anterior and posterior right pleural thickening consistent with loculated effusion. Several dots of air anterior pleura could be due to recent procedure, or empyema.  Emphysema.  Left lower lobe subpleural 6 mm part solid nodule (series 5, image 183). Nearby 4 mm left lower lobe peripheral nodule (image 191). Left upper lobe 5 mm subpleural triangular nodule (image 110).  Right apical 6 mm nodule with tethered pleura (image 50).  MEDIASTINUM/AXILLAE: Pretracheal adenopathy measures 2.3 x 1.3 cm (4, 68). There are additional smaller mediastinal nodes.  CORONARY ARTERY CALCIFICATION: None.  UPPER ABDOMEN: Left adrenal irlanda 1.7 cm adenoma.   MUSCULOSKELETAL: Unremarkable.  IMPRESSION:   1.  Right middle and lower lobe consolidation consistent with pneumonia.  2.  Loculated right pleural effusions. Several dots of air in the anterior pleural fluid could be due to recent instrumentation, or empyema.  3.  Indeterminate bilateral 4 to 6 mm nodules. Follow-up guidelines below.  4.  Emphysema.          By:  Winston Seymour  MD Zach, 08/10/2022  5:38 PM     Primary Care Physician:  No Ref-Primary, Physician

## 2022-08-10 NOTE — TELEPHONE ENCOUNTER
TC received from Dr. López, [facility] pulmonologist, who is requesting TFS to return a call to him to discuss pt's CT scan results. Call back number [phone].

## 2022-08-10 NOTE — PHARMACY-ADMISSION MEDICATION HISTORY
Pharmacy Note - Admission Medication History     ______________________________________________________________________    Prior To Admission (PTA) med list completed and updated in EMR.       PTA Med List   Medication Sig Last Dose     levofloxacin (LEVAQUIN) 500 MG tablet Take 1 tablet (500 mg) by mouth daily for 10 days 8/10/2022 at am       Information source(s): Patient, Clinic records and [facility]  Method of interview communication: in-person    Summary of Changes to PTA Med List  New: Levaquin  Discontinued: nothing   Changed: nothing     Patient was asked about OTC/herbal products specifically.  PTA med list reflects this.    In the past week, patient estimated taking medication this percent of the time:  greater than 90%.    Allergies were reviewed, assessed, and updated with the patient.      Patient does not use any multi-dose medications prior to admission.    The information provided in this note is only as accurate as the sources available at the time of the update(s).    Thank you for the opportunity to participate in the care of this patient.    Kapil Leigh RPH  8/10/2022 3:04 PM

## 2022-08-10 NOTE — H&P
[facility]    History and Physical - Hospitalist Service       Date of Admission:  8/10/2022    Assessment & Plan      Elaina Charles is a 56 year old female admitted on 8/10/2022. She has a hx of smoking and was dx CAP as outpatient. She then continued to have cough and 2 days ago had CT which showed concern for ongoing infiltrate and even now possible empyema    1.CAP with likely empyema  -iv cefepime +Doxy  -pulm consult to decide on intervention for empyema  -wbc 15  -blood cx    2.Smoker  -encourage her to stop  -patch prn    3.leukocytosis  -15 today, trend  -check procal  -blood cx    4.DVT prevent- scd    5.Code-full      The patient's care was discussed with the Patient.    Sheridan Scales MD  Hospitalist Service  [facility]  Securely message with the Vocera Web Console (learn more here)  Text page via etaskr Paging/Directory         ______________________________________________________________________    Chief Complaint   cough    History is obtained from the patient    History of Present Illness     Elaina Charles is a 56 year old female admitted on 8/10/2022. She has a hx of smoking and was dx CAP as outpatient. She then continued to have cough and 2 days ago had CT which showed concern for ongoing infiltrate and even now possible empyema    She notes she was first dx first pneumonia back late May. She notes she was recently put back on Levoquin for this and then had outside CT done on 8/8/22 which showed worse infection    She notes she smokes and is trying to cut back and has cut back to 2/day    She thinks she has had some wt loss now and decreased appetite due to all of this too.        Review of Systems    CONSTITUTIONAL:  positive for  fatigue, malaise, anorexia and weight loss  EYES:  negative  HEENT:  negative  RESPIRATORY:  positive for  dry cough  CARDIOVASCULAR:  negative  GASTROINTESTINAL:  negative  GENITOURINARY:   negative  INTEGUMENT/BREAST:  negative  HEMATOLOGIC/LYMPHATIC:  negative  ALLERGIC/IMMUNOLOGIC:  negative  ENDOCRINE:  negative  MUSCULOSKELETAL:  negative  NEUROLOGICAL:  negative  BEHAVIOR/PSYCH:  negative    Past Medical History    Smoker    Past Surgical History   Hx of thumb fx as kid    Social History   I have reviewed this patient's social history and updated it with pertinent information if needed.  Social History     Tobacco Use     Smoking status: Current Every Day Smoker     Smokeless tobacco: Never Used   works for [employer]    FH  -no lung disease      Prior to Admission Medications   Prior to Admission Medications   Prescriptions Last Dose Informant Patient Reported? Taking?   levofloxacin (LEVAQUIN) 500 MG tablet 8/10/2022 at am  No Yes   Sig: Take 1 tablet (500 mg) by mouth daily for 10 days      Facility-Administered Medications: None     Allergies   Allergies   Allergen Reactions     Amoxicillin Swelling and Rash     Occurred when she was 19 and upon rechallenge some years later         Physical Exam   Vital Signs: Temp: 99.9  F (37.7  C) Temp src: Oral BP: 117/64 Pulse: 99   Resp: 18 SpO2: 92 % O2 Device: None (Room air)    Weight: 131 lbs 0 oz    Constitutional: awake, alert, cooperative and no apparent distress  Respiratory: no increased work of breathing, moderate air exchange, no retractions and diminished breath sounds right base  Cardiovascular: Normal apical impulse, regular rate and rhythm, normal S1 and S2, no S3 or S4, and no murmur noted  GI: normal bowel sounds, soft, non-distended and non-tender  Skin: no bruising or bleeding  Musculoskeletal: no lower extremity pitting edema present  Neurologic: Mental Status Exam:  Level of Alertness:   awake  Neuropsychiatric: General: normal, calm and normal eye contact    Data   Data reviewed today: I reviewed all medications, new labs and imaging results over the last 24 hours. I personally reviewed labs and images    Recent Labs   Lab  08/10/22  1310   WBC 15.9*   HGB 12.7   MCV 88   *      POTASSIUM 3.9   CHLORIDE 97*   CO2 30   BUN 7*   CR 0.63   ANIONGAP 9   JACKIE 9.4   GLC 97       CT 8/8/22  1.  Right middle and lower lobe consolidation consistent with pneumonia.  2.  Loculated right pleural effusions. Several dots of air in the anterior pleural fluid could be due to recent instrumentation, or empyema.  3.  Indeterminate bilateral 4 to 6 mm nodules. Follow-up guidelines below.  4.  Emphysema.

## 2022-08-11 ENCOUNTER — APPOINTMENT (OUTPATIENT)
Dept: ULTRASOUND IMAGING | Facility: HOSPITAL | Age: 57
DRG: 163 | End: 2022-08-11
Attending: SURGERY
Payer: COMMERCIAL

## 2022-08-11 LAB
ANION GAP SERPL CALCULATED.3IONS-SCNC: 8 MMOL/L (ref 5–18)
BASOPHILS # BLD AUTO: 0.1 10E3/UL (ref 0–0.2)
BASOPHILS NFR BLD AUTO: 1 %
BUN SERPL-MCNC: 9 MG/DL (ref 8–22)
CALCIUM SERPL-MCNC: 8.7 MG/DL (ref 8.5–10.5)
CHLORIDE BLD-SCNC: 103 MMOL/L (ref 98–107)
CO2 SERPL-SCNC: 27 MMOL/L (ref 22–31)
CREAT SERPL-MCNC: 0.53 MG/DL (ref 0.6–1.1)
EOSINOPHIL # BLD AUTO: 0.3 10E3/UL (ref 0–0.7)
EOSINOPHIL NFR BLD AUTO: 2 %
ERYTHROCYTE [DISTWIDTH] IN BLOOD BY AUTOMATED COUNT: 14.1 % (ref 10–15)
GFR SERPL CREATININE-BSD FRML MDRD: >90 ML/MIN/1.73M2
GLUCOSE BLD-MCNC: 100 MG/DL (ref 70–125)
GLUCOSE BODY FLUID SOURCE: NORMAL
GLUCOSE FLD-MCNC: 11 MG/DL
HCT VFR BLD AUTO: 35.2 % (ref 35–47)
HGB BLD-MCNC: 11.6 G/DL (ref 11.7–15.7)
HOLD SPECIMEN: NORMAL
IMM GRANULOCYTES # BLD: 0.1 10E3/UL
IMM GRANULOCYTES NFR BLD: 1 %
INR PPP: 1.14 (ref 0.85–1.15)
LDH SERPL L TO P-CCNC: 229 U/L (ref 125–220)
LYMPHOCYTES # BLD AUTO: 2 10E3/UL (ref 0.8–5.3)
LYMPHOCYTES NFR BLD AUTO: 14 %
MCH RBC QN AUTO: 28.9 PG (ref 26.5–33)
MCHC RBC AUTO-ENTMCNC: 33 G/DL (ref 31.5–36.5)
MCV RBC AUTO: 88 FL (ref 78–100)
MONOCYTES # BLD AUTO: 1.3 10E3/UL (ref 0–1.3)
MONOCYTES NFR BLD AUTO: 9 %
NEUTROPHILS # BLD AUTO: 11 10E3/UL (ref 1.6–8.3)
NEUTROPHILS NFR BLD AUTO: 73 %
NRBC # BLD AUTO: 0 10E3/UL
NRBC BLD AUTO-RTO: 0 /100
PH BODY FLUID SOURCE: NORMAL
PH FLD: 8.5 PH
PLATELET # BLD AUTO: 395 10E3/UL (ref 150–450)
POTASSIUM BLD-SCNC: 3.5 MMOL/L (ref 3.5–5)
PROCALCITONIN SERPL-MCNC: 0.07 NG/ML (ref 0–0.49)
PROT SERPL-MCNC: 6.1 G/DL (ref 6–8)
RBC # BLD AUTO: 4.02 10E6/UL (ref 3.8–5.2)
SODIUM SERPL-SCNC: 138 MMOL/L (ref 136–145)
WBC # BLD AUTO: 14.7 10E3/UL (ref 4–11)

## 2022-08-11 PROCEDURE — 87205 SMEAR GRAM STAIN: CPT | Performed by: SURGERY

## 2022-08-11 PROCEDURE — 250N000011 HC RX IP 250 OP 636: Performed by: INTERNAL MEDICINE

## 2022-08-11 PROCEDURE — 83986 ASSAY PH BODY FLUID NOS: CPT | Performed by: INTERNAL MEDICINE

## 2022-08-11 PROCEDURE — 0W9930Z DRAINAGE OF RIGHT PLEURAL CAVITY WITH DRAINAGE DEVICE, PERCUTANEOUS APPROACH: ICD-10-PCS | Performed by: RADIOLOGY

## 2022-08-11 PROCEDURE — 83615 LACTATE (LD) (LDH) ENZYME: CPT | Performed by: SURGERY

## 2022-08-11 PROCEDURE — 85610 PROTHROMBIN TIME: CPT | Performed by: INTERNAL MEDICINE

## 2022-08-11 PROCEDURE — 99233 SBSQ HOSP IP/OBS HIGH 50: CPT | Performed by: INTERNAL MEDICINE

## 2022-08-11 PROCEDURE — 89050 BODY FLUID CELL COUNT: CPT | Performed by: SURGERY

## 2022-08-11 PROCEDURE — 80048 BASIC METABOLIC PNL TOTAL CA: CPT | Performed by: INTERNAL MEDICINE

## 2022-08-11 PROCEDURE — 120N000001 HC R&B MED SURG/OB

## 2022-08-11 PROCEDURE — 84157 ASSAY OF PROTEIN OTHER: CPT | Performed by: SURGERY

## 2022-08-11 PROCEDURE — 36415 COLL VENOUS BLD VENIPUNCTURE: CPT | Performed by: SURGERY

## 2022-08-11 PROCEDURE — 83615 LACTATE (LD) (LDH) ENZYME: CPT | Performed by: INTERNAL MEDICINE

## 2022-08-11 PROCEDURE — 84155 ASSAY OF PROTEIN SERUM: CPT | Performed by: SURGERY

## 2022-08-11 PROCEDURE — 84145 PROCALCITONIN (PCT): CPT | Performed by: INTERNAL MEDICINE

## 2022-08-11 PROCEDURE — 36415 COLL VENOUS BLD VENIPUNCTURE: CPT | Performed by: INTERNAL MEDICINE

## 2022-08-11 PROCEDURE — 85004 AUTOMATED DIFF WBC COUNT: CPT | Performed by: INTERNAL MEDICINE

## 2022-08-11 PROCEDURE — 32555 ASPIRATE PLEURA W/ IMAGING: CPT

## 2022-08-11 PROCEDURE — 87077 CULTURE AEROBIC IDENTIFY: CPT | Performed by: SURGERY

## 2022-08-11 PROCEDURE — 82945 GLUCOSE OTHER FLUID: CPT | Performed by: SURGERY

## 2022-08-11 RX ADMIN — VANCOMYCIN HYDROCHLORIDE 1000 MG: 1 INJECTION, SOLUTION INTRAVENOUS at 19:40

## 2022-08-11 RX ADMIN — VANCOMYCIN HYDROCHLORIDE 1000 MG: 1 INJECTION, SOLUTION INTRAVENOUS at 06:52

## 2022-08-11 RX ADMIN — CEFEPIME 2 G: 2 INJECTION, POWDER, FOR SOLUTION INTRAVENOUS at 06:16

## 2022-08-11 RX ADMIN — HEPARIN SODIUM 5000 UNITS: 10000 INJECTION, SOLUTION INTRAVENOUS; SUBCUTANEOUS at 17:51

## 2022-08-11 RX ADMIN — CEFEPIME 2 G: 2 INJECTION, POWDER, FOR SOLUTION INTRAVENOUS at 21:00

## 2022-08-11 RX ADMIN — CEFEPIME 2 G: 2 INJECTION, POWDER, FOR SOLUTION INTRAVENOUS at 14:23

## 2022-08-11 ASSESSMENT — ACTIVITIES OF DAILY LIVING (ADL)
ADLS_ACUITY_SCORE: 20

## 2022-08-11 NOTE — CONSULTS
GENERAL SURGERY CONSULTATION    Elaina Charles  [place]  Medical Record #:  [id number]  YOB: 1965  Age:  56 year old     Date of Consultation: 8/11/2022    Reason for Consultation: Pneumonia with complex pleural fluid process    Elaina Charles is a 56 year old female who presents with a history of respiratory illness since May 2022.  She initially had discomfort in her right chest without significant coughing or sputum production and she has been seen since then in multiple locations including urgency rooms and emergency rooms in [facility] in [place] and now admitted at [facility].  She has had 2 different courses of antibiotics the last starting last week.  She had a CT scan completed in May of this year which did not show any major fluid collections but which did show some infiltrates and nodular changes in the lungs.  She did not have additional x-ray findings chest x-ray or CT until a CTA which was completed in the last several days indicating significant right complex pleural fluid process.  She continues to have pleuritic pain on the right side and she denies fevers chills cough or sputum production.  She has not had this symptom complex previously.  The patient is a significant smoker approximately 1 pack/day for an extended length of time.  She denies significant shortness of breath and her usual daily activities at home or in her capacity of work at a [employer].  She at the current time is seen in the general care [place] setting and she has discomfort right chest with deep breathing and coughing and otherwise has minimal symptoms.  No significant sputum production.  No significant travel history.  No previous pneumonia history.    PHH:  History reviewed. No pertinent past medical history.   History reviewed. No pertinent surgical history.    ALLERGIES:  Amoxicillin    MEDS:    Current Facility-Administered Medications:      acetaminophen (TYLENOL)  tablet 325 mg, 325 mg, Oral, Q4H PRN, Sheridan Scales MD     ceFEPIme (MAXIPIME) 2 g vial to attach to  ml bag for ADULTS or 50 ml bag for PEDS, 2 g, Intravenous, Q8H, Sheridan Scales MD, 2 g at 08/11/22 0616     heparin ANTICOAGULANT injection 5,000 Units, 5,000 Units, Subcutaneous, Q12H, Winston Hein MD     lidocaine (LMX4) cream, , Topical, Q1H PRN, Sheridan Scales MD     lidocaine 1 % 0.1-1 mL, 0.1-1 mL, Other, Q1H PRN, Sheridan Scales MD     melatonin tablet 1 mg, 1 mg, Oral, At Bedtime PRN, Sheridan Scales MD     nicotine (NICODERM CQ) 14 MG/24HR 24 hr patch 1 patch, 1 patch, Transdermal, Daily PRN, Sheridan Scales MD     nicotine Patch in Place, , Transdermal, Q8H, Sheridan Scales MD     sodium chloride (PF) 0.9% PF flush 3 mL, 3 mL, Intracatheter, Q8H, Sheridan Scales MD, 3 mL at 08/11/22 0018     sodium chloride (PF) 0.9% PF flush 3 mL, 3 mL, Intracatheter, q1 min prn, Sheridan Scales MD     vancomycin (VANCOCIN) 1000 mg in dextrose 5% 200 mL PREMIX, 1,000 mg, Intravenous, Q12H, Winston Hein MD, Last Rate: 200 mL/hr at 08/11/22 0652, 1,000 mg at 08/11/22 0652    SOCIAL HABITS:    History   Smoking Status     Current Every Day Smoker   Smokeless Tobacco     Never Used     Social History    Substance and Sexual Activity      Alcohol use: Not on file      History   Drug Use Not on file       FAMILY HISTORY:  History reviewed. No pertinent family history.    REVIEW OF SYSTEMS: Noted and reviewed.  Cardiac history is without myocardial infarction or angina.  No swelling of the legs.  Without cerebrovascular event.  And no known arterial vascular disease carotid or peripheral vascular disease.  She does not have claudication lower extremities.  Without gastrointestinal or  symptoms.  No history of endocrine disease.  She does not take any medications before admission to the hospital.  Allergies to amoxicillin causing rash.    PE:    BP  "102/61 (BP Location: Right arm)   Pulse 85   Temp 98.4  F (36.9  C) (Oral)   Resp 22   Ht 1.575 m (5' 2\")   Wt 57.7 kg (127 lb 3.2 oz)   SpO2 94%   BMI 23.27 kg/m      HEENT: Head neck exam within normal limits without mass or adenopathy trachea midline thyroid normal  Chest: Benign on percussion  Lungs: Lungs reveal distant breath sounds right side some expiratory wheezing and decreased breath sounds right compared to left.  She has a discomfort with deep inspiration on the right.  Heart: Heart rate 80/min regular  Abd: Abdomen quite benign without organomegaly masses tenderness or previous surgical incisions or hernias.  No inguinal hernias epigastric or umbilical hernia  Ext: Grossly normal without edema without chronic venous changes and without varicose veins.  Vascular: Carotids are equally present base of neck no bruits in the carotids no bruit in the abdomen pedal pulses are present.    LABS:    Recent Labs   Lab 08/11/22  0603      CO2 27   BUN 9      Recent Labs   Lab 08/11/22  0646   WBC 14.7*   HGB 11.6*   HCT 35.2       No results for input(s): ALKPHOS, BILITOT, ALT, AST in the last 168 hours.    Invalid input(s): BILIDIR, PROT, LABALBU  No results for input(s): AMYLASE in the last 168 hours.    Recent Labs   Lab 08/11/22  0646   INR 1.14       XRAYS: Chest x-rays and CTs noted CTA from May CT recent all personally reviewed with reviewed with radiology.    ASSESSMENT: Probable right side pneumonia with empyema with fluid collection right thorax which is not easily drainable.  Length of disease and illness up to 2 months at this point.    PLAN: We will obtain aspiration for detection of probable empyema.  We will tentatively schedule for thoracoscopy and this potential surgical invention has been reviewed with the patient.  May proceed with this tomorrow.  Reviewed again with radiology and single tube drainage and/or lytics with drainage not a good choice in this circumstance as " result of the diffuse nature of the process and potential significant length of time the processes been there for organization and difficulty to clear with drainage alone.  Continue current antibiotics.  We will order needle aspiration without necessarily having a tube placed and potential thoracoscopy would be undertaken tomorrow.  Thanks for allow me to assist in this patient's care    Serg carrillo md  [facility]

## 2022-08-11 NOTE — PROGRESS NOTES
[facility]    PROGRESS NOTE - Hospitalist Service    ASSESSMENT AND PLAN     Active Problems:    Empyema (H)    HCAP (healthcare-associated pneumonia)    Failure of outpatient treatment    Elaina Charles is a 56 year old female admitted on 8/10/2022. She has a hx of smoking and was dx CAP as outpatient. She then continued to have cough and 2 days ago had CT which showed concern for ongoing infiltrate and even now possible empyema.     Acute pneumonia status post failed outpatient treatment   Received cefepime and doxycycline here-now switched to cefepime and vancomycin   Pulmonology following   Surgery planning for needle aspiration and potential thoracoscopy tomorrow 8/12.     CT chest from 8/8 reviewed with right middle and lower lobe consolidations.  Loculated right pleural effusions.  Indeterminate bilateral 4 to 6 mm nodules.  Empyema   Needs follow-up CT in 3 to 6 months as patient is high risk with smoking history    Acute hypoxic respiratory failure secondary to above   Wean oxygen as able   Incentive spirometry    Leukocytosis likely secondary to above infection    Tobacco dependence   Patch provided    Barriers to discharge: Improvement in respiratory status, surgery intervention    Anticipated length of stay: Several days      Present on Admission:    Empyema (H)    HCAP (healthcare-associated pneumonia)    Failure of outpatient treatment      Subjective:  Patient resting comfortably in bed.  Notes pain to right chest wall with deep inspiration.  Encouraged incentive spirometer.  No other complaints from patient.    PHYSICAL EXAM  Temp:  [98.3  F (36.8  C)-99.5  F (37.5  C)] 98.4  F (36.9  C)  Pulse:  [] 85  Resp:  [18-24] 22  BP: (102-122)/(58-70) 102/61  SpO2:  [87 %-96 %] 94 %  Wt Readings from Last 1 Encounters:   08/11/22 57.7 kg (127 lb 3.2 oz)       Intake/Output Summary (Last 24 hours) at 8/11/2022 1200  Last data filed at 8/11/2022 0023  Gross per 24 hour   Intake  459 ml   Output 600 ml   Net -141 ml      Body mass index is 23.27 kg/m .    GENRL: Alert and answering questions appropriately. Not in acute distress. Lying in bed   HEENT: no JVP elevation, no lymphadenopathy or thyromegaly  CHEST: Diminished breath sounds at right lower base.  Crackles noted mid lung fields.  No wheezes.  No tachypnea.    HEART: Regular rate and rhythm, S1S2 auscultated. No murmurs  ABDMN: Soft. Non-tender, non-distended. No organomegaly. No guarding or rigidity. Bowel sounds present   EXTRM: No pedal edema, DP pulses 2+. No discoloration   NEURO: Cranial nerves II-XII grossly intact. No focal neurological deficit. No involuntary movements. Normal mentation  PSYCH: Normal affect and mood.   INTGM: No skin rash, no cyanosis or clubbing    PERTINENT LABS/IMAGING:  No results found for this visit on 08/10/22.  Most Recent 3 CBC's:Recent Labs   Lab Test 08/11/22  0646 08/10/22  1310   WBC 14.7* 15.9*   HGB 11.6* 12.7   MCV 88 88    490*       No results for input(s): CHOL, HDL, LDL, TRIG, CHOLHDLRATIO in the last 66174 hours.  No results for input(s): LDL in the last 62946 hours.  Recent Labs   Lab Test 08/11/22  0603      POTASSIUM 3.5   CHLORIDE 103   CO2 27      BUN 9   CR 0.53*   GFRESTIMATED >90   JACKIE 8.7     No results for input(s): A1C in the last 83398 hours.  Recent Labs   Lab Test 08/11/22  0646 08/10/22  1310   HGB 11.6* 12.7     No results for input(s): TROPONINI in the last 23355 hours.  No results for input(s): BNP, NTBNPI, NTBNP in the last 23558 hours.  No results for input(s): TSH in the last 59600 hours.  Recent Labs   Lab Test 08/11/22  0646   INR 1.14       Sima Cage DO  Hospitalist Service  [facility]  Text page via [employer] Paging/Directory

## 2022-08-11 NOTE — PLAN OF CARE
Problem: Plan of Care - These are the overarching goals to be used throughout the patient stay.    Goal: Plan of Care Review/Shift Note  Description: The Plan of Care Review/Shift note should be completed every shift.  The Outcome Evaluation is a brief statement about your assessment that the patient is improving, declining, or no change.  This information will be displayed automatically on your shift note.  Outcome: Ongoing, Progressing   Goal Outcome Evaluation:  Educated pt on treatment plan, pt voiced understanding.    Problem: Plan of Care - These are the overarching goals to be used throughout the patient stay.    Goal: Optimal Comfort and Wellbeing  Intervention: Monitor Pain and Promote Comfort  Recent Flowsheet Documentation  Taken 8/10/2022 1704 by Ny Palmer, RN  Pain Management Interventions:   declines   distraction   rest   Pt initially had pain to R flank area intermittently when she would cough. Declined PRN medication as pain intense but infrequent. Pt received sched. Neb & per pt report pain/cough much better.     Pt was placed on 2L 02 or RA 02 sats of 87%. Sats WNL on 2L 02. Cont. With IV abx. 0 s/s resp distress. RR WNL.

## 2022-08-11 NOTE — PLAN OF CARE
"  Problem: Plan of Care - These are the overarching goals to be used throughout the patient stay.    Goal: Plan of Care Review/Shift Note  Description: The Plan of Care Review/Shift note should be completed every shift.  The Outcome Evaluation is a brief statement about your assessment that the patient is improving, declining, or no change.  This information will be displayed automatically on your shift note.  Outcome: Ongoing, Progressing  Flowsheets (Taken 8/11/2022 1038)  Plan of Care Reviewed With: patient  Goal: Patient-Specific Goal (Individualized)  Description: You can add care plan individualizations to a care plan. Examples of Individualization might be:  \"Parent requests to be called daily at 9am for status\", \"I have a hard time hearing out of my right ear\", or \"Do not touch me to wake me up as it startles me\".  Outcome: Ongoing, Progressing  Goal: Absence of Hospital-Acquired Illness or Injury  Outcome: Ongoing, Progressing  Intervention: Identify and Manage Fall Risk  Recent Flowsheet Documentation  Taken 8/11/2022 0800 by Gary Stevens RN  Safety Promotion/Fall Prevention:   fall prevention program maintained   nonskid shoes/slippers when out of bed  Intervention: Prevent Skin Injury  Recent Flowsheet Documentation  Taken 8/11/2022 0800 by Gary Stevens RN  Body Position: position changed independently  Intervention: Prevent and Manage VTE (Venous Thromboembolism) Risk  Recent Flowsheet Documentation  Taken 8/11/2022 0800 by Gary Stevens RN  Activity Management:   activity adjusted per tolerance   ambulated in room  Goal: Optimal Comfort and Wellbeing  Outcome: Ongoing, Progressing  Goal: Readiness for Transition of Care  Outcome: Ongoing, Progressing     Problem: Pain Acute  Goal: Acceptable Pain Control and Functional Ability  Outcome: Ongoing, Progressing     Problem: Gas Exchange Impaired  Goal: Optimal Gas Exchange  Outcome: Ongoing, Progressing  Intervention: Optimize Oxygenation and " Ventilation  Recent Flowsheet Documentation  Taken 8/11/2022 0800 by Gary Stevens RN  Head of Bed (HOB) Positioning: HOB at 20-30 degrees     Problem: Fluid Imbalance (Pneumonia)  Goal: Fluid Balance  Outcome: Ongoing, Progressing     Problem: Infection (Pneumonia)  Goal: Resolution of Infection Signs and Symptoms  Outcome: Ongoing, Progressing     Problem: Respiratory Compromise (Pneumonia)  Goal: Effective Oxygenation and Ventilation  Outcome: Ongoing, Progressing  Intervention: Optimize Oxygenation and Ventilation  Recent Flowsheet Documentation  Taken 8/11/2022 0800 by Gary Stevens RN  Head of Bed (HOB) Positioning: HOB at 20-30 degrees   Goal Outcome Evaluation:    Plan of Care Reviewed With: patient

## 2022-08-11 NOTE — PLAN OF CARE
Problem: Pain Acute  Goal: Acceptable Pain Control and Functional Ability  Outcome: Ongoing, Progressing     Problem: Gas Exchange Impaired  Goal: Optimal Gas Exchange  Outcome: Ongoing, Progressing     Problem: Respiratory Compromise (Pneumonia)  Goal: Effective Oxygenation and Ventilation  Outcome: Ongoing, Progressing   Goal Outcome Evaluation:    Pt had an uneventful shift.  Slept between cares.  Denied pain.  Denied shortness of breath while at rest.  Sats 93% on 2 liters nc.  Pt NPO this shift per order.

## 2022-08-11 NOTE — PROGRESS NOTES
"PULMONARY MEDICINE PROGRESS NOTE  8/11/2022    Admit Date: 8/10/2022  CODE: Full Code    Reason for Consult: loculated right pleural effusion    Assessment/Plan:   56 year old female with a history of tobacco dependence, recent right-sided pneumonia, presented on 8/10 with right lateral chest pain, found to have loculated right pleural with gas bubbles, s/p right thoracentesis on 8/11.    Loculated right pleural effusion: Right chest pain, occurring after recent pneumonia, loculated small amount of fluid with gas bubbles, clinically and radiographically consistent with right empyema. Patient denies any recent dental or periodontal disease. Right thoracentesis today with predictably minimal fluid return, studies in process. Patient is on room air, pain well-controlled. Expresses good understanding of the plan.    Plan:  - appreciate surgery consultation  - right pleural fluid studies in process  - continue cefepime and vanco for now  - possible right thoracoscopic washout with Dr. Alvarez tomorrow  - tobacco cessation counseling  - will follow with you    Miguel Johnson MD  Pulmonary and Critical Care Medicine  [facility]  Vocera  Office [phone]  Pager [phone]  (he/him/his)                                                                                                                                                        SUBJECTIVE/INTERVAL HISTORY   Right lateral chest pain manageable. No dyspnea. Minimal cough.                                                                                                                                                      Exam/Data:     Vitals  /53 (BP Location: Right arm)   Pulse 96   Temp 98.4  F (36.9  C) (Oral)   Resp 18   Ht 1.575 m (5' 2\")   Wt 57.7 kg (127 lb 3.2 oz)   SpO2 95%   BMI 23.27 kg/m       I/O last 3 completed shifts:  In: 459 [P.O.:240; I.V.:219]  Out: 600 [Urine:600]  Weight change:   [unfilled]  EXAM:  /53 " "(BP Location: Right arm)   Pulse 96   Temp 98.4  F (36.9  C) (Oral)   Resp 18   Ht 1.575 m (5' 2\")   Wt 57.7 kg (127 lb 3.2 oz)   SpO2 95%   BMI 23.27 kg/m      Intake/Output last 3 shifts:  I/O last 3 completed shifts:  In: 459 [P.O.:240; I.V.:219]  Out: 600 [Urine:600]  Intake/Output this shift:  No intake/output data recorded.    Physical Exam  Gen: alert, oriented, no distress  HEENT: NT, no JUSTIN  CV: RRR, no m/g/r  Resp: diminished with crackles right mid-low lung fields  Abd: soft, nontender, BS+  Skin: no rashes or lesions  Ext: no edema  Neuro: PERRL, nonfocal exam    ROS: A complete 10-system review of systems was obtained and is negative with the exception of what is noted in the history of present illness.    Medications:       ceFEPIme (MAXIPIME) IV  2 g Intravenous Q8H     heparin ANTICOAGULANT  5,000 Units Subcutaneous Q12H     nicotine   Transdermal Q8H     sodium chloride (PF)  3 mL Intracatheter Q8H     vancomycin  1,000 mg Intravenous Q12H         DATA  All laboratory and radiology has been personally reviewed by myself today.  Recent Labs   Lab 08/11/22  0646   WBC 14.7*   HGB 11.6*   HCT 35.2        Recent Labs   Lab 08/11/22  0603 08/10/22  1310    136   CO2 27 30   BUN 9 7*       PFT DATA:  None available    IMAGING:   Chest CT (8/8/22):  - images directly reviewed, formal interpretation follows:  FINDINGS:   LUNGS AND PLEURA: Right middle and lower lobe consolidation with air bronchograms compatible with pneumonia. Anterior and posterior right pleural thickening consistent with loculated effusion. Several dots of air anterior pleura could be due to recent   procedure, or empyema.     Emphysema.     Left lower lobe subpleural 6 mm part solid nodule (series 5, image 183). Nearby 4 mm left lower lobe peripheral nodule (image 191). Left upper lobe 5 mm subpleural triangular nodule (image 110).     Right apical 6 mm nodule with tethered pleura (image 50).     MEDIASTINUM/AXILLAE: " Pretracheal adenopathy measures 2.3 x 1.3 cm (4, 68). There are additional smaller mediastinal nodes.     CORONARY ARTERY CALCIFICATION: None.     UPPER ABDOMEN: Left adrenal irlanda 1.7 cm adenoma.     MUSCULOSKELETAL: Unremarkable.                                                                      IMPRESSION:   1.  Right middle and lower lobe consolidation consistent with pneumonia.  2.  Loculated right pleural effusions. Several dots of air in the anterior pleural fluid could be due to recent instrumentation, or empyema.  3.  Indeterminate bilateral 4 to 6 mm nodules. Follow-up guidelines below.  4.  Emphysema.

## 2022-08-12 ENCOUNTER — ANESTHESIA (OUTPATIENT)
Dept: SURGERY | Facility: HOSPITAL | Age: 57
DRG: 163 | End: 2022-08-12
Payer: COMMERCIAL

## 2022-08-12 ENCOUNTER — ANESTHESIA EVENT (OUTPATIENT)
Dept: SURGERY | Facility: HOSPITAL | Age: 57
DRG: 163 | End: 2022-08-12
Payer: COMMERCIAL

## 2022-08-12 ENCOUNTER — APPOINTMENT (OUTPATIENT)
Dept: RADIOLOGY | Facility: HOSPITAL | Age: 57
DRG: 163 | End: 2022-08-12
Attending: SURGERY
Payer: COMMERCIAL

## 2022-08-12 LAB
ABO/RH(D): NORMAL
ANION GAP SERPL CALCULATED.3IONS-SCNC: 8 MMOL/L (ref 5–18)
ANTIBODY SCREEN: NEGATIVE
BASOPHILS # BLD AUTO: 0.1 10E3/UL (ref 0–0.2)
BASOPHILS NFR BLD AUTO: 1 %
BUN SERPL-MCNC: 8 MG/DL (ref 8–22)
CALCIUM SERPL-MCNC: 9.2 MG/DL (ref 8.5–10.5)
CHLORIDE BLD-SCNC: 103 MMOL/L (ref 98–107)
CO2 SERPL-SCNC: 31 MMOL/L (ref 22–31)
CREAT SERPL-MCNC: 0.53 MG/DL (ref 0.6–1.1)
CREAT SERPL-MCNC: 0.57 MG/DL (ref 0.6–1.1)
EOSINOPHIL # BLD AUTO: 0.4 10E3/UL (ref 0–0.7)
EOSINOPHIL NFR BLD AUTO: 3 %
ERYTHROCYTE [DISTWIDTH] IN BLOOD BY AUTOMATED COUNT: 14.3 % (ref 10–15)
GFR SERPL CREATININE-BSD FRML MDRD: >90 ML/MIN/1.73M2
GFR SERPL CREATININE-BSD FRML MDRD: >90 ML/MIN/1.73M2
GLUCOSE BLD-MCNC: 103 MG/DL (ref 70–125)
GRAM STAIN RESULT: NORMAL
GRAM STAIN RESULT: NORMAL
HCT VFR BLD AUTO: 37.4 % (ref 35–47)
HGB BLD-MCNC: 11.9 G/DL (ref 11.7–15.7)
HOLD SPECIMEN: NORMAL
IMM GRANULOCYTES # BLD: 0.3 10E3/UL
IMM GRANULOCYTES NFR BLD: 2 %
LD BODY BODY FLUID SOURCE: NORMAL
LDH FLD L TO P-CCNC: 857 U/L
LYMPHOCYTES # BLD AUTO: 2.6 10E3/UL (ref 0.8–5.3)
LYMPHOCYTES NFR BLD AUTO: 20 %
MCH RBC QN AUTO: 28.4 PG (ref 26.5–33)
MCHC RBC AUTO-ENTMCNC: 31.8 G/DL (ref 31.5–36.5)
MCV RBC AUTO: 89 FL (ref 78–100)
MONOCYTES # BLD AUTO: 1.2 10E3/UL (ref 0–1.3)
MONOCYTES NFR BLD AUTO: 9 %
NEUTROPHILS # BLD AUTO: 8.8 10E3/UL (ref 1.6–8.3)
NEUTROPHILS NFR BLD AUTO: 65 %
NRBC # BLD AUTO: 0 10E3/UL
NRBC BLD AUTO-RTO: 0 /100
PLAT MORPH BLD: NORMAL
PLATELET # BLD AUTO: 468 10E3/UL (ref 150–450)
POTASSIUM BLD-SCNC: 3.8 MMOL/L (ref 3.5–5)
PROT FLD-MCNC: 1.3 G/DL
PROTEIN BODY FLUID SOURCE: NORMAL
RBC # BLD AUTO: 4.19 10E6/UL (ref 3.8–5.2)
RBC MORPH BLD: NORMAL
SODIUM SERPL-SCNC: 142 MMOL/L (ref 136–145)
SPECIMEN EXPIRATION DATE: NORMAL
VANCOMYCIN SERPL-MCNC: 6.3 MG/L
WBC # BLD AUTO: 13.1 10E3/UL (ref 4–11)

## 2022-08-12 PROCEDURE — 250N000011 HC RX IP 250 OP 636: Performed by: ANESTHESIOLOGY

## 2022-08-12 PROCEDURE — 250N000011 HC RX IP 250 OP 636: Performed by: NURSE ANESTHETIST, CERTIFIED REGISTERED

## 2022-08-12 PROCEDURE — 250N000013 HC RX MED GY IP 250 OP 250 PS 637: Performed by: SURGERY

## 2022-08-12 PROCEDURE — 258N000003 HC RX IP 258 OP 636: Performed by: HOSPITALIST

## 2022-08-12 PROCEDURE — 87102 FUNGUS ISOLATION CULTURE: CPT | Performed by: SURGERY

## 2022-08-12 PROCEDURE — 272N000001 HC OR GENERAL SUPPLY STERILE: Performed by: SURGERY

## 2022-08-12 PROCEDURE — 0BCN4ZZ EXTIRPATION OF MATTER FROM RIGHT PLEURA, PERCUTANEOUS ENDOSCOPIC APPROACH: ICD-10-PCS | Performed by: SURGERY

## 2022-08-12 PROCEDURE — 360N000077 HC SURGERY LEVEL 4, PER MIN: Performed by: SURGERY

## 2022-08-12 PROCEDURE — 250N000009 HC RX 250: Performed by: NURSE ANESTHETIST, CERTIFIED REGISTERED

## 2022-08-12 PROCEDURE — 86850 RBC ANTIBODY SCREEN: CPT | Performed by: ANESTHESIOLOGY

## 2022-08-12 PROCEDURE — 250N000011 HC RX IP 250 OP 636: Performed by: SURGERY

## 2022-08-12 PROCEDURE — 87205 SMEAR GRAM STAIN: CPT | Performed by: SURGERY

## 2022-08-12 PROCEDURE — 87070 CULTURE OTHR SPECIMN AEROBIC: CPT | Performed by: SURGERY

## 2022-08-12 PROCEDURE — 258N000003 HC RX IP 258 OP 636: Performed by: ANESTHESIOLOGY

## 2022-08-12 PROCEDURE — 99233 SBSQ HOSP IP/OBS HIGH 50: CPT | Performed by: INTERNAL MEDICINE

## 2022-08-12 PROCEDURE — 258N000003 HC RX IP 258 OP 636: Performed by: NURSE ANESTHETIST, CERTIFIED REGISTERED

## 2022-08-12 PROCEDURE — 80202 ASSAY OF VANCOMYCIN: CPT | Performed by: INTERNAL MEDICINE

## 2022-08-12 PROCEDURE — 88305 TISSUE EXAM BY PATHOLOGIST: CPT | Mod: 26 | Performed by: PATHOLOGY

## 2022-08-12 PROCEDURE — 271N000002 HC RX 271: Performed by: SURGERY

## 2022-08-12 PROCEDURE — 250N000011 HC RX IP 250 OP 636: Performed by: INTERNAL MEDICINE

## 2022-08-12 PROCEDURE — 36415 COLL VENOUS BLD VENIPUNCTURE: CPT | Performed by: SURGERY

## 2022-08-12 PROCEDURE — 710N000009 HC RECOVERY PHASE 1, LEVEL 1, PER MIN: Performed by: SURGERY

## 2022-08-12 PROCEDURE — 999N000065 XR CHEST PORT 1 VIEW

## 2022-08-12 PROCEDURE — 36415 COLL VENOUS BLD VENIPUNCTURE: CPT | Performed by: INTERNAL MEDICINE

## 2022-08-12 PROCEDURE — 82565 ASSAY OF CREATININE: CPT | Performed by: SURGERY

## 2022-08-12 PROCEDURE — 370N000017 HC ANESTHESIA TECHNICAL FEE, PER MIN: Performed by: SURGERY

## 2022-08-12 PROCEDURE — 88305 TISSUE EXAM BY PATHOLOGIST: CPT | Mod: TC | Performed by: SURGERY

## 2022-08-12 PROCEDURE — 250N000011 HC RX IP 250 OP 636: Performed by: HOSPITALIST

## 2022-08-12 PROCEDURE — 120N000001 HC R&B MED SURG/OB

## 2022-08-12 PROCEDURE — 87075 CULTR BACTERIA EXCEPT BLOOD: CPT | Performed by: SURGERY

## 2022-08-12 PROCEDURE — 250N000025 HC SEVOFLURANE, PER MIN: Performed by: SURGERY

## 2022-08-12 PROCEDURE — 82310 ASSAY OF CALCIUM: CPT | Performed by: INTERNAL MEDICINE

## 2022-08-12 PROCEDURE — 250N000013 HC RX MED GY IP 250 OP 250 PS 637: Performed by: INTERNAL MEDICINE

## 2022-08-12 PROCEDURE — 85025 COMPLETE CBC W/AUTO DIFF WBC: CPT | Performed by: INTERNAL MEDICINE

## 2022-08-12 PROCEDURE — 0BJ08ZZ INSPECTION OF TRACHEOBRONCHIAL TREE, VIA NATURAL OR ARTIFICIAL OPENING ENDOSCOPIC: ICD-10-PCS | Performed by: SURGERY

## 2022-08-12 PROCEDURE — 99232 SBSQ HOSP IP/OBS MODERATE 35: CPT | Performed by: HOSPITALIST

## 2022-08-12 PROCEDURE — 999N000141 HC STATISTIC PRE-PROCEDURE NURSING ASSESSMENT: Performed by: SURGERY

## 2022-08-12 PROCEDURE — 258N000001 HC RX 258: Performed by: SURGERY

## 2022-08-12 RX ORDER — PROCHLORPERAZINE MALEATE 10 MG
10 TABLET ORAL EVERY 6 HOURS PRN
Status: DISCONTINUED | OUTPATIENT
Start: 2022-08-12 | End: 2022-08-15 | Stop reason: HOSPADM

## 2022-08-12 RX ORDER — ONDANSETRON 4 MG/1
4 TABLET, ORALLY DISINTEGRATING ORAL EVERY 30 MIN PRN
Status: DISCONTINUED | OUTPATIENT
Start: 2022-08-12 | End: 2022-08-12 | Stop reason: HOSPADM

## 2022-08-12 RX ORDER — PROPOFOL 10 MG/ML
INJECTION, EMULSION INTRAVENOUS PRN
Status: DISCONTINUED | OUTPATIENT
Start: 2022-08-12 | End: 2022-08-12

## 2022-08-12 RX ORDER — FENTANYL CITRATE 50 UG/ML
INJECTION, SOLUTION INTRAMUSCULAR; INTRAVENOUS PRN
Status: DISCONTINUED | OUTPATIENT
Start: 2022-08-12 | End: 2022-08-12

## 2022-08-12 RX ORDER — HALOPERIDOL 5 MG/ML
1 INJECTION INTRAMUSCULAR
Status: DISCONTINUED | OUTPATIENT
Start: 2022-08-12 | End: 2022-08-12 | Stop reason: HOSPADM

## 2022-08-12 RX ORDER — AMOXICILLIN 250 MG
1 CAPSULE ORAL 2 TIMES DAILY
Status: DISCONTINUED | OUTPATIENT
Start: 2022-08-12 | End: 2022-08-15 | Stop reason: HOSPADM

## 2022-08-12 RX ORDER — ACETAMINOPHEN 325 MG/1
975 TABLET ORAL EVERY 8 HOURS
Status: COMPLETED | OUTPATIENT
Start: 2022-08-12 | End: 2022-08-15

## 2022-08-12 RX ORDER — ALBUTEROL SULFATE 0.83 MG/ML
2.5 SOLUTION RESPIRATORY (INHALATION) EVERY 4 HOURS PRN
Status: DISCONTINUED | OUTPATIENT
Start: 2022-08-12 | End: 2022-08-12 | Stop reason: HOSPADM

## 2022-08-12 RX ORDER — NALOXONE HYDROCHLORIDE 0.4 MG/ML
0.2 INJECTION, SOLUTION INTRAMUSCULAR; INTRAVENOUS; SUBCUTANEOUS
Status: DISCONTINUED | OUTPATIENT
Start: 2022-08-12 | End: 2022-08-15 | Stop reason: HOSPADM

## 2022-08-12 RX ORDER — BISACODYL 10 MG
10 SUPPOSITORY, RECTAL RECTAL DAILY PRN
Status: DISCONTINUED | OUTPATIENT
Start: 2022-08-12 | End: 2022-08-15 | Stop reason: HOSPADM

## 2022-08-12 RX ORDER — METRONIDAZOLE 500 MG/100ML
500 INJECTION, SOLUTION INTRAVENOUS EVERY 12 HOURS
Status: DISCONTINUED | OUTPATIENT
Start: 2022-08-12 | End: 2022-08-15 | Stop reason: HOSPADM

## 2022-08-12 RX ORDER — LABETALOL HYDROCHLORIDE 5 MG/ML
10 INJECTION, SOLUTION INTRAVENOUS
Status: DISCONTINUED | OUTPATIENT
Start: 2022-08-12 | End: 2022-08-12 | Stop reason: HOSPADM

## 2022-08-12 RX ORDER — ACETAMINOPHEN 325 MG/1
650 TABLET ORAL EVERY 4 HOURS PRN
Status: DISCONTINUED | OUTPATIENT
Start: 2022-08-15 | End: 2022-08-15 | Stop reason: HOSPADM

## 2022-08-12 RX ORDER — ENOXAPARIN SODIUM 100 MG/ML
40 INJECTION SUBCUTANEOUS EVERY 24 HOURS
Status: DISCONTINUED | OUTPATIENT
Start: 2022-08-13 | End: 2022-08-15 | Stop reason: HOSPADM

## 2022-08-12 RX ORDER — POLYETHYLENE GLYCOL 3350 17 G/17G
17 POWDER, FOR SOLUTION ORAL DAILY
Status: DISCONTINUED | OUTPATIENT
Start: 2022-08-13 | End: 2022-08-15 | Stop reason: HOSPADM

## 2022-08-12 RX ORDER — ONDANSETRON 4 MG/1
4 TABLET, ORALLY DISINTEGRATING ORAL EVERY 6 HOURS PRN
Status: DISCONTINUED | OUTPATIENT
Start: 2022-08-12 | End: 2022-08-15 | Stop reason: HOSPADM

## 2022-08-12 RX ORDER — LIDOCAINE 40 MG/G
CREAM TOPICAL
Status: DISCONTINUED | OUTPATIENT
Start: 2022-08-12 | End: 2022-08-15 | Stop reason: HOSPADM

## 2022-08-12 RX ORDER — DEXMEDETOMIDINE HYDROCHLORIDE 4 UG/ML
INJECTION, SOLUTION INTRAVENOUS
Status: DISCONTINUED
Start: 2022-08-12 | End: 2022-08-12 | Stop reason: HOSPADM

## 2022-08-12 RX ORDER — HYDROMORPHONE HYDROCHLORIDE 1 MG/ML
0.5 INJECTION, SOLUTION INTRAMUSCULAR; INTRAVENOUS; SUBCUTANEOUS
Status: DISCONTINUED | OUTPATIENT
Start: 2022-08-12 | End: 2022-08-15 | Stop reason: HOSPADM

## 2022-08-12 RX ORDER — SODIUM CHLORIDE, SODIUM LACTATE, POTASSIUM CHLORIDE, CALCIUM CHLORIDE 600; 310; 30; 20 MG/100ML; MG/100ML; MG/100ML; MG/100ML
INJECTION, SOLUTION INTRAVENOUS CONTINUOUS
Status: DISCONTINUED | OUTPATIENT
Start: 2022-08-12 | End: 2022-08-12 | Stop reason: HOSPADM

## 2022-08-12 RX ORDER — KETAMINE HYDROCHLORIDE 10 MG/ML
INJECTION INTRAMUSCULAR; INTRAVENOUS PRN
Status: DISCONTINUED | OUTPATIENT
Start: 2022-08-12 | End: 2022-08-12

## 2022-08-12 RX ORDER — ONDANSETRON 2 MG/ML
4 INJECTION INTRAMUSCULAR; INTRAVENOUS EVERY 30 MIN PRN
Status: DISCONTINUED | OUTPATIENT
Start: 2022-08-12 | End: 2022-08-12 | Stop reason: HOSPADM

## 2022-08-12 RX ORDER — NALOXONE HYDROCHLORIDE 0.4 MG/ML
0.4 INJECTION, SOLUTION INTRAMUSCULAR; INTRAVENOUS; SUBCUTANEOUS
Status: DISCONTINUED | OUTPATIENT
Start: 2022-08-12 | End: 2022-08-15 | Stop reason: HOSPADM

## 2022-08-12 RX ORDER — MEPERIDINE HYDROCHLORIDE 25 MG/ML
12.5 INJECTION INTRAMUSCULAR; INTRAVENOUS; SUBCUTANEOUS EVERY 5 MIN PRN
Status: DISCONTINUED | OUTPATIENT
Start: 2022-08-12 | End: 2022-08-12 | Stop reason: HOSPADM

## 2022-08-12 RX ORDER — OXYCODONE HYDROCHLORIDE 5 MG/1
5 TABLET ORAL EVERY 4 HOURS PRN
Status: DISCONTINUED | OUTPATIENT
Start: 2022-08-12 | End: 2022-08-12 | Stop reason: HOSPADM

## 2022-08-12 RX ORDER — GLYCOPYRROLATE 0.2 MG/ML
INJECTION, SOLUTION INTRAMUSCULAR; INTRAVENOUS PRN
Status: DISCONTINUED | OUTPATIENT
Start: 2022-08-12 | End: 2022-08-12

## 2022-08-12 RX ORDER — HYDROMORPHONE HYDROCHLORIDE 4 MG/1
4 TABLET ORAL EVERY 4 HOURS PRN
Status: DISCONTINUED | OUTPATIENT
Start: 2022-08-12 | End: 2022-08-15 | Stop reason: HOSPADM

## 2022-08-12 RX ORDER — MAGNESIUM SULFATE 4 G/50ML
4 INJECTION INTRAVENOUS ONCE
Status: COMPLETED | OUTPATIENT
Start: 2022-08-12 | End: 2022-08-12

## 2022-08-12 RX ORDER — FENTANYL CITRATE 50 UG/ML
50 INJECTION, SOLUTION INTRAMUSCULAR; INTRAVENOUS EVERY 5 MIN PRN
Status: DISCONTINUED | OUTPATIENT
Start: 2022-08-12 | End: 2022-08-12 | Stop reason: HOSPADM

## 2022-08-12 RX ORDER — DEXAMETHASONE SODIUM PHOSPHATE 4 MG/ML
INJECTION, SOLUTION INTRA-ARTICULAR; INTRALESIONAL; INTRAMUSCULAR; INTRAVENOUS; SOFT TISSUE PRN
Status: DISCONTINUED | OUTPATIENT
Start: 2022-08-12 | End: 2022-08-12

## 2022-08-12 RX ORDER — LIDOCAINE HYDROCHLORIDE 10 MG/ML
INJECTION, SOLUTION INFILTRATION; PERINEURAL PRN
Status: DISCONTINUED | OUTPATIENT
Start: 2022-08-12 | End: 2022-08-12

## 2022-08-12 RX ORDER — HYDROMORPHONE HYDROCHLORIDE 2 MG/1
2 TABLET ORAL EVERY 4 HOURS PRN
Status: DISCONTINUED | OUTPATIENT
Start: 2022-08-12 | End: 2022-08-15 | Stop reason: HOSPADM

## 2022-08-12 RX ORDER — CEFAZOLIN SODIUM 1 G/50ML
1250 SOLUTION INTRAVENOUS EVERY 12 HOURS
Status: DISCONTINUED | OUTPATIENT
Start: 2022-08-12 | End: 2022-08-13 | Stop reason: ALTCHOICE

## 2022-08-12 RX ORDER — ONDANSETRON 2 MG/ML
4 INJECTION INTRAMUSCULAR; INTRAVENOUS EVERY 6 HOURS PRN
Status: DISCONTINUED | OUTPATIENT
Start: 2022-08-12 | End: 2022-08-15 | Stop reason: HOSPADM

## 2022-08-12 RX ORDER — ACETAMINOPHEN 325 MG/1
975 TABLET ORAL ONCE
Status: DISCONTINUED | OUTPATIENT
Start: 2022-08-12 | End: 2022-08-12 | Stop reason: HOSPADM

## 2022-08-12 RX ORDER — LIDOCAINE 40 MG/G
CREAM TOPICAL
Status: DISCONTINUED | OUTPATIENT
Start: 2022-08-12 | End: 2022-08-12 | Stop reason: HOSPADM

## 2022-08-12 RX ADMIN — CEFEPIME 2 G: 2 INJECTION, POWDER, FOR SOLUTION INTRAVENOUS at 14:19

## 2022-08-12 RX ADMIN — ACETAMINOPHEN 975 MG: 325 TABLET ORAL at 19:55

## 2022-08-12 RX ADMIN — FENTANYL CITRATE 50 MCG: 50 INJECTION, SOLUTION INTRAMUSCULAR; INTRAVENOUS at 11:45

## 2022-08-12 RX ADMIN — ACETAMINOPHEN 325 MG: 325 TABLET ORAL at 00:38

## 2022-08-12 RX ADMIN — DEXAMETHASONE SODIUM PHOSPHATE 5 MG: 4 INJECTION, SOLUTION INTRA-ARTICULAR; INTRALESIONAL; INTRAMUSCULAR; INTRAVENOUS; SOFT TISSUE at 09:29

## 2022-08-12 RX ADMIN — VANCOMYCIN HYDROCHLORIDE 1250 MG: 5 INJECTION, POWDER, LYOPHILIZED, FOR SOLUTION INTRAVENOUS at 19:40

## 2022-08-12 RX ADMIN — PHENYLEPHRINE HYDROCHLORIDE 100 MCG: 10 INJECTION INTRAVENOUS at 10:14

## 2022-08-12 RX ADMIN — GLYCOPYRROLATE 0.2 MG: 0.2 INJECTION, SOLUTION INTRAMUSCULAR; INTRAVENOUS at 09:29

## 2022-08-12 RX ADMIN — ROCURONIUM BROMIDE 50 MG: 50 INJECTION, SOLUTION INTRAVENOUS at 09:29

## 2022-08-12 RX ADMIN — Medication: at 11:20

## 2022-08-12 RX ADMIN — SUGAMMADEX 200 MG: 100 INJECTION, SOLUTION INTRAVENOUS at 10:44

## 2022-08-12 RX ADMIN — SENNOSIDES AND DOCUSATE SODIUM 1 TABLET: 50; 8.6 TABLET ORAL at 19:03

## 2022-08-12 RX ADMIN — HYDROMORPHONE HYDROCHLORIDE 0.5 MG: 1 INJECTION, SOLUTION INTRAMUSCULAR; INTRAVENOUS; SUBCUTANEOUS at 10:45

## 2022-08-12 RX ADMIN — FENTANYL CITRATE 100 MCG: 50 INJECTION, SOLUTION INTRAMUSCULAR; INTRAVENOUS at 09:29

## 2022-08-12 RX ADMIN — KETAMINE HYDROCHLORIDE 50 MG: 10 INJECTION, SOLUTION INTRAMUSCULAR; INTRAVENOUS at 09:29

## 2022-08-12 RX ADMIN — DEXTROSE AND SODIUM CHLORIDE: 5; 450 INJECTION, SOLUTION INTRAVENOUS at 14:14

## 2022-08-12 RX ADMIN — HYDROMORPHONE HYDROCHLORIDE 0.5 MG: 1 INJECTION, SOLUTION INTRAMUSCULAR; INTRAVENOUS; SUBCUTANEOUS at 19:03

## 2022-08-12 RX ADMIN — FENTANYL CITRATE 50 MCG: 50 INJECTION, SOLUTION INTRAMUSCULAR; INTRAVENOUS at 11:31

## 2022-08-12 RX ADMIN — MIDAZOLAM 2 MG: 1 INJECTION INTRAMUSCULAR; INTRAVENOUS at 09:29

## 2022-08-12 RX ADMIN — CEFEPIME 2 G: 2 INJECTION, POWDER, FOR SOLUTION INTRAVENOUS at 21:31

## 2022-08-12 RX ADMIN — ACETAMINOPHEN 975 MG: 325 TABLET ORAL at 13:44

## 2022-08-12 RX ADMIN — METRONIDAZOLE 500 MG: 500 INJECTION, SOLUTION INTRAVENOUS at 18:11

## 2022-08-12 RX ADMIN — PHENYLEPHRINE HYDROCHLORIDE 0.3 MCG/KG/MIN: 10 INJECTION INTRAVENOUS at 10:15

## 2022-08-12 RX ADMIN — LIDOCAINE HYDROCHLORIDE 5 ML: 10 INJECTION, SOLUTION INFILTRATION; PERINEURAL at 09:29

## 2022-08-12 RX ADMIN — PROPOFOL 120 MG: 10 INJECTION, EMULSION INTRAVENOUS at 09:29

## 2022-08-12 RX ADMIN — HYDROMORPHONE HYDROCHLORIDE 0.5 MG: 1 INJECTION, SOLUTION INTRAMUSCULAR; INTRAVENOUS; SUBCUTANEOUS at 13:44

## 2022-08-12 RX ADMIN — CEFEPIME 2 G: 2 INJECTION, POWDER, FOR SOLUTION INTRAVENOUS at 06:25

## 2022-08-12 RX ADMIN — SODIUM CHLORIDE, POTASSIUM CHLORIDE, SODIUM LACTATE AND CALCIUM CHLORIDE: 600; 310; 30; 20 INJECTION, SOLUTION INTRAVENOUS at 11:57

## 2022-08-12 RX ADMIN — PHENYLEPHRINE HYDROCHLORIDE 100 MCG: 10 INJECTION INTRAVENOUS at 08:51

## 2022-08-12 RX ADMIN — VANCOMYCIN HYDROCHLORIDE 1000 MG: 1 INJECTION, SOLUTION INTRAVENOUS at 07:08

## 2022-08-12 RX ADMIN — SODIUM CHLORIDE, POTASSIUM CHLORIDE, SODIUM LACTATE AND CALCIUM CHLORIDE: 600; 310; 30; 20 INJECTION, SOLUTION INTRAVENOUS at 08:49

## 2022-08-12 RX ADMIN — HYDROMORPHONE HYDROCHLORIDE 4 MG: 4 TABLET ORAL at 17:56

## 2022-08-12 RX ADMIN — PHENYLEPHRINE HYDROCHLORIDE 100 MCG: 10 INJECTION INTRAVENOUS at 09:45

## 2022-08-12 RX ADMIN — PHENYLEPHRINE HYDROCHLORIDE 100 MCG: 10 INJECTION INTRAVENOUS at 10:18

## 2022-08-12 RX ADMIN — MAGNESIUM SULFATE HEPTAHYDRATE 4 G: 80 INJECTION, SOLUTION INTRAVENOUS at 08:50

## 2022-08-12 ASSESSMENT — ACTIVITIES OF DAILY LIVING (ADL)
ADLS_ACUITY_SCORE: 20
ADLS_ACUITY_SCORE: 20
ADLS_ACUITY_SCORE: 22
ADLS_ACUITY_SCORE: 20
ADLS_ACUITY_SCORE: 22
ADLS_ACUITY_SCORE: 20
ADLS_ACUITY_SCORE: 22

## 2022-08-12 ASSESSMENT — COPD QUESTIONNAIRES: COPD: 1

## 2022-08-12 ASSESSMENT — LIFESTYLE VARIABLES: TOBACCO_USE: 1

## 2022-08-12 ASSESSMENT — ENCOUNTER SYMPTOMS: SEIZURES: 0

## 2022-08-12 NOTE — PROGRESS NOTES
Progress Note    Assessment/Plan  Patient clinically stable.  Some pleuritic pain.  Laboratory studies noted.  Patient is status post ultrasound thoracentesis yesterday for small amount of fluid serosanguineous.  Glucose count very low.  Gram stain negative.  Reviewed current clinical situation with the patient again.  In light of her long term course and the complex pleural fluid process and the inability to gain adequate drainage after review with multiple radiologist with tube thoracostomy recommendation is for thoracoscopy with decortication potential thoracotomy.  This is again reviewed with patient risk benefits and complications of surgical intervention reviewed.  Pictures are drawn indicating the mixed pleural fluid process for patient's understanding.  We will plan on surgical intervention today with thoracoscopy and she is aware that 3 large chest tubes will be placed at the completion of the procedure.    Active Problems:    Empyema (H)    HCAP (healthcare-associated pneumonia)    Failure of outpatient treatment      Subjective  As above pleuritic pain.  Denies other general symptoms currently.  Objective    Vital signs in last 24 hours  Temp:  [98.2  F (36.8  C)-99.7  F (37.6  C)] 98.2  F (36.8  C)  Pulse:  [90-97] 97  Resp:  [16-20] 17  BP: ()/(53-58) 100/55  SpO2:  [92 %-95 %] 92 %  Weight:   [unfilled]    Intake/Output last 3 shifts  I/O last 3 completed shifts:  In: 240 [P.O.:240]  Out: 2800 [Urine:2800]  Intake/Output this shift:  No intake/output data recorded.      Physical Exam  Head and neck within normal limits.  No masses or adenopathy.  Lungs with significant decreased breath sounds right side with some wheezing.  Left side reasonably clear.  Legs without evidence of edema and without pain.  Cardiac exam reveals a regular rhythm rate approximately 70/min.    Pertinent Labs   Lab Results   Component Value Date    WBC 13.1 (H) 08/12/2022    HGB 11.9 08/12/2022    HCT 37.4 08/12/2022     MCV 89 08/12/2022     (H) 08/12/2022             Pertinent Radiology     [unfilled]        Serg Alvarez MD

## 2022-08-12 NOTE — ANESTHESIA POSTPROCEDURE EVALUATION
Patient: Elaina Charles    Procedure: Procedure(s):  THORACOSCOPY WITH DECORTICATION       Anesthesia Type:  General    Note:  Disposition: Inpatient   Postop Pain Control: Uneventful            Sign Out: Well controlled pain   PONV: No   Neuro/Psych: Uneventful            Sign Out: Acceptable/Baseline neuro status   Airway/Respiratory: Uneventful            Sign Out: Acceptable/Baseline resp. status   CV/Hemodynamics: Uneventful            Sign Out: Acceptable CV status   Other NRE: NONE   DID A NON-ROUTINE EVENT OCCUR? No           Last vitals:  Vitals Value Taken Time   /82 08/12/22 1200   Temp     Pulse 90 08/12/22 1200   Resp 28 08/12/22 1200   SpO2 92 % 08/12/22 1200   Vitals shown include unvalidated device data.    Electronically Signed By: Evangelista Stover MD  August 12, 2022  12:02 PM

## 2022-08-12 NOTE — PROGRESS NOTES
Care Management Follow Up    Length of Stay (days): 2    Expected Discharge Date: 08/16/2022     Concerns to be Addressed:       Patient plan of care discussed at interdisciplinary rounds: Yes    Anticipated Discharge Disposition:       Anticipated Discharge Services:    Anticipated Discharge DME:      Patient/family educated on Medicare website which has current facility and service quality ratings:    Education Provided on the Discharge Plan:    Patient/Family in Agreement with the Plan:      Referrals Placed by CM/SW:    Private pay costs discussed: Not applicable    Additional Information:  Chart assessed. Plan for surgery today. CM will follow for needs post op      Katie Avila RN

## 2022-08-12 NOTE — ANESTHESIA PREPROCEDURE EVALUATION
Anesthesia Pre-Procedure Evaluation    Patient: Elaina Charles   MRN: [id number] : 1965        Procedure : Procedure(s):  THORACOSCOPY          History reviewed. No pertinent past medical history.   History reviewed. No pertinent surgical history.   Allergies   Allergen Reactions     Amoxicillin Swelling and Rash     Occurred when she was 19 and upon rechallenge some years later        Social History     Tobacco Use     Smoking status: Current Every Day Smoker     Smokeless tobacco: Never Used   Substance Use Topics     Alcohol use: Not on file      Wt Readings from Last 1 Encounters:   22 58.5 kg (129 lb)        Anesthesia Evaluation            ROS/MED HX  ENT/Pulmonary: Comment: CT 2022:  IMPRESSION:   1.  Right middle and lower lobe consolidation consistent with pneumonia.  2.  Loculated right pleural effusions. Several dots of air in the anterior pleural fluid could be due to recent instrumentation, or empyema.  3.  Indeterminate bilateral 4 to 6 mm nodules. Follow-up guidelines below.  4.  Emphysema.    (+) tobacco use, COPD, recent URI, unresolved, right sided empyema:     Neurologic:    (-) no seizures and no CVA   Cardiovascular:  - neg cardiovascular ROS     METS/Exercise Tolerance:     Hematologic:  - neg hematologic  ROS     Musculoskeletal:  - neg musculoskeletal ROS     GI/Hepatic:  - neg GI/hepatic ROS     Renal/Genitourinary:  - neg Renal ROS     Endo:  - neg endo ROS     Psychiatric/Substance Use:       Infectious Disease:  - neg infectious disease ROS     Malignancy:       Other:            Physical Exam    Airway        Mallampati: II   TM distance: > 3 FB   Neck ROM: full     Respiratory Devices and Support         Dental  no notable dental history       B=Bridge, C=Chipped, L=Loose, M=Missing    Cardiovascular          Rhythm and rate: regular and normal     Pulmonary           breath sounds clear to auscultation       Other findings: WBC 13.1, plt 468, hgb 11.9    OUTSIDE  LABS:  CBC:   Lab Results   Component Value Date    WBC 13.1 (H) 08/12/2022    WBC 14.7 (H) 08/11/2022    HGB 11.9 08/12/2022    HGB 11.6 (L) 08/11/2022    HCT 37.4 08/12/2022    HCT 35.2 08/11/2022     (H) 08/12/2022     08/11/2022     BMP:   Lab Results   Component Value Date     08/12/2022     08/11/2022    POTASSIUM 3.8 08/12/2022    POTASSIUM 3.5 08/11/2022    CHLORIDE 103 08/12/2022    CHLORIDE 103 08/11/2022    CO2 31 08/12/2022    CO2 27 08/11/2022    BUN 8 08/12/2022    BUN 9 08/11/2022    CR 0.57 (L) 08/12/2022    CR 0.53 (L) 08/11/2022     08/12/2022     08/11/2022     COAGS:   Lab Results   Component Value Date    INR 1.14 08/11/2022     POC: No results found for: BGM, HCG, HCGS  HEPATIC:   Lab Results   Component Value Date    PROTTOTAL 6.1 08/11/2022     OTHER:   Lab Results   Component Value Date    LACT 1.3 08/10/2022    JACKIE 9.2 08/12/2022    MAG 1.9 08/10/2022       Anesthesia Plan    ASA Status:  3   NPO Status:  NPO Appropriate    Anesthesia Type: General.     - Airway: ETT   Induction: Propofol.      Techniques and Equipment:     - Airway: Double lumen ETT, Video-Laryngoscope, Fiberoptic Bronchoscope         Consents    Anesthesia Plan(s) and associated risks, benefits, and realistic alternatives discussed. Questions answered and patient/representative(s) expressed understanding.    - Discussed:     - Discussed with:  Patient      - Extended Intubation/Ventilatory Support Discussed: No.      - Patient is DNR/DNI Status: No    Use of blood products discussed: Yes.     - Discussed with: Patient.     - Consented: consented to blood products            Reason for refusal: other.     Postoperative Care    Pain management: IV analgesics, Multi-modal analgesia.   PONV prophylaxis: Ondansetron (or other 5HT-3), Dexamethasone or Solumedrol     Comments:    Other Comments: GETA, mag, acetaminophen, ketamine, and precedex bolus (total of 0.5mcg/kg at end of case).  Plan for SUNSHINE have 35Fr and 37Fr in room. Fiberoptic and GS in room.             Evangelista Stover MD

## 2022-08-12 NOTE — PLAN OF CARE
"  Problem: Plan of Care - These are the overarching goals to be used throughout the patient stay.    Goal: Plan of Care Review/Shift Note  Description: The Plan of Care Review/Shift note should be completed every shift.  The Outcome Evaluation is a brief statement about your assessment that the patient is improving, declining, or no change.  This information will be displayed automatically on your shift note.  8/11/2022 2204 by Jaylen Beckman RN  Outcome: Ongoing, Progressing  Flowsheets (Taken 8/11/2022 2204)  Plan of Care Reviewed With: patient  8/11/2022 1656 by Jaylen Beckman RN  Outcome: Ongoing, Progressing  Flowsheets (Taken 8/11/2022 1656)  Plan of Care Reviewed With: patient  Overall Patient Progress: improving     Problem: Plan of Care - These are the overarching goals to be used throughout the patient stay.    Goal: Plan of Care Review/Shift Note  Description: The Plan of Care Review/Shift note should be completed every shift.  The Outcome Evaluation is a brief statement about your assessment that the patient is improving, declining, or no change.  This information will be displayed automatically on your shift note.  8/11/2022 2204 by Jaylen Beckman RN  Outcome: Ongoing, Progressing  Flowsheets (Taken 8/11/2022 2204)  Plan of Care Reviewed With: patient  8/11/2022 1656 by Jaylen Beckman RN  Outcome: Ongoing, Progressing  Flowsheets (Taken 8/11/2022 1656)  Plan of Care Reviewed With: patient  Overall Patient Progress: improving  Goal: Patient-Specific Goal (Individualized)  Description: You can add care plan individualizations to a care plan. Examples of Individualization might be:  \"Parent requests to be called daily at 9am for status\", \"I have a hard time hearing out of my right ear\", or \"Do not touch me to wake me up as it startles me\".  Outcome: Ongoing, Progressing  Goal: Absence of Hospital-Acquired Illness or Injury  Outcome: Ongoing, Progressing  Intervention: " Identify and Manage Fall Risk  Recent Flowsheet Documentation  Taken 8/11/2022 1600 by Jaylen Beckman RN  Safety Promotion/Fall Prevention: room door open  Intervention: Prevent Skin Injury  Recent Flowsheet Documentation  Taken 8/11/2022 1600 by Jaylen Beckman RN  Body Position: position changed independently  Intervention: Prevent and Manage VTE (Venous Thromboembolism) Risk  Recent Flowsheet Documentation  Taken 8/11/2022 1600 by Jaylen Beckman RN  Activity Management: activity adjusted per tolerance  Goal: Optimal Comfort and Wellbeing  Outcome: Ongoing, Progressing  Goal: Readiness for Transition of Care  Outcome: Ongoing, Progressing     Problem: Pain Acute  Goal: Acceptable Pain Control and Functional Ability  Outcome: Ongoing, Progressing     Problem: Gas Exchange Impaired  Goal: Optimal Gas Exchange  Outcome: Ongoing, Progressing     Problem: Fluid Imbalance (Pneumonia)  Goal: Fluid Balance  Outcome: Ongoing, Progressing     Problem: Infection (Pneumonia)  Goal: Resolution of Infection Signs and Symptoms  Outcome: Ongoing, Progressing     Problem: Respiratory Compromise (Pneumonia)  Goal: Effective Oxygenation and Ventilation  Outcome: Ongoing, Progressing   Goal Outcome Evaluation:    Plan of Care Reviewed With: patient     Overall Patient Progress: improving

## 2022-08-12 NOTE — PHARMACY-VANCOMYCIN DOSING SERVICE
Pharmacy Vancomycin Note  Date of Service 2022  Patient's  1965   56 year old, female    Indication: Empyema  Day of Therapy: 3  Current vancomycin regimen:  1000 mg IV q12h  Current vancomycin monitoring method: AUC  Current vancomycin therapeutic monitoring goal: 400-600 mg*h/L    [employer]ight[employer] Prediction of Current Vancomycin Regimen    Regimen: 1000 mg IV every 12 hours.  Start time: 09:31 on 2022  Exposure target: AUC24 (range)400-600 mg/L.hr   AUC24,ss: 401 mg/L.hr  Probability of AUC24 > 400: 50 %  Ctrough,ss: 10.0 mg/L  Probability of Ctrough,ss > 20: 1 %  Probability of nephrotoxicity (Lodise MOISES ): 6 %    Current estimated CrCl = Estimated Creatinine Clearance: 103 mL/min (A) (based on SCr of 0.57 mg/dL (L)).    Creatinine for last 3 days  8/10/2022:  1:10 PM Creatinine 0.63 mg/dL  2022:  6:03 AM Creatinine 0.53 mg/dL  2022:  6:24 AM Creatinine 0.57 mg/dL    Recent Vancomycin Levels (past 3 days)  2022:  6:24 AM Vancomycin 6.3 mg/L    Vancomycin IV Administrations (past 72 hours)                   vancomycin (VANCOCIN) 1000 mg in dextrose 5% 200 mL PREMIX (mg) 1,000 mg New Bag 22 0708     1,000 mg New Bag 22 1940     1,000 mg New Bag  0652     1,000 mg New Bag 08/10/22 1838                Nephrotoxins and other renal medications (From now, onward)    Start     Dose/Rate Route Frequency Ordered Stop    08/10/22 1900  [Auto Hold]  vancomycin (VANCOCIN) 1000 mg in dextrose 5% 200 mL PREMIX        (Auto Hold since 2022 at 0821.Hold Reason: Transfer to a procedural area.)    1,000 mg  200 mL/hr over 1 Hours Intravenous EVERY 12 HOURS 08/10/22 1740               Contrast Orders - past 72 hours (72h ago, onward)    None          Interpretation of levels and current regimen:  Vancomycin level is reflective of subtherapeutic level    Has serum creatinine changed greater than 50% in last 72 hours: No    Renal Function: Stable    Insight[employer] Prediction of  Planned New Vancomycin Regimen    Regimen: 1250 mg IV every 12 hours.  Start time: 10:03 on 08/12/2022  Exposure target: AUC24 (range)400-600 mg/L.hr   AUC24,ss: 540 mg/L.hr  Probability of AUC24 > 400: 79 %  Ctrough,ss: 17.9 mg/L  Probability of Ctrough,ss > 20: 40 %  Probability of nephrotoxicity (Lodise MOISES 2009): 14 %      Plan:  1. Increase Dose to 1250 mg IV q12h  2. Vancomycin monitoring method: AUC  3. Vancomycin therapeutic monitoring goal: 400-600 mg*h/L  4. Pharmacy will check vancomycin levels as appropriate in 3-5 Days.  5. Serum creatinine levels will be ordered daily for the first week of therapy and at least twice weekly for subsequent weeks.    AKI DUEÑAS [facility]

## 2022-08-12 NOTE — PROGRESS NOTES
[facility] Hospitalist Progress Note  [facility]  Admission date: 8/10/2022    Summary:    56F with R sided PNA and loculated R pleural effusion with air now POD#0 s/p thorascopic decortication for empyema    Assessment/Plan    #RLL PNA with empyema - cefepime, vanco pending culture results.  ? Need anaerobic coverage - add flagyl.  Cultures and path pending  #tobacco abuse- smoking cessation  #incidental pulm nodules - pulm f/u  #acute pain - controlled currently.  #acute hypoxic respiratory failure - due to #1.  O2  #tobacco abuse- NRT.  Cessation.    Checklist:  Code Status: Full Code    Diet: Advance Diet as Tolerated: Full Liquid Diet    Travis Catheter: Not present  Central Lines: None  DVT px:  Enoxaparin (Lovenox) SQ        Auto-populated based on system request - if relevant they will be addressed above:  Clinically Significant Risk Factors Present on Admission                     Auto-populated from discharge tab:     Expected Discharge Date: 08/16/2022    Discharge Delays: Procedure Pending (enter procedure & time in comments)    Discharge Comments: IV antibiotics, Pulmonary consult  thoractomy 8/12         Overnight Events/Subjective/Notable results:  Pain controlled.  No SOB    4 point ROS otherwise negative    Objective    Vital signs in last 24 hours  Temp:  [97.2  F (36.2  C)-99.7  F (37.6  C)] 98.1  F (36.7  C)  Pulse:  [] 84  Resp:  [16-28] 18  BP: ()/(53-87) 103/58  SpO2:  [90 %-98 %] 94 % O2 Device: Nasal cannula    Weight:   129 lbs 0 oz    Intake/Output last 3 shifts  I/O last 3 completed shifts:  In: 240 [P.O.:240]  Out: 3137 [Urine:2800; Blood:10; Chest Tube:327]  Body mass index is 23.59 kg/m .    Physical Exam  General:  Alert, cooperative, no distress    Neurologic:  oriented, facial symmetry preserved, fluent speech.   Psych: calm, mood and affect appropriate to situation  HEENT:  Anicteric, MMM  CV: RRR no MRG, normal S1 and S2, no edema  Lungs: CTAB.   Easyrespirations  Abd: soft, NT, normoactive BS  Skin: no rashes or jaundice noted on exposed skin.    Central Lines and Tubes: chest tubes with small air leak     I have reviewed all labs, medications, imaging studies in the last 24 hours.  Pertinent findings&changes discussed above.    Data     Discussed with: janette Medel MD  Internal Medicine Hospitalist

## 2022-08-12 NOTE — PLAN OF CARE
Problem: Respiratory Compromise   Goal: Optimal Oxygenation and Ventilation  Intervention: Manage Chest tube  Flowsheets (Taken 8/12/2022 1501)  Administration (IS):   instruction provided, initial   mouthpiece utilized   proper technique demonstrated   self-administered  Level Incentive Spirometer (mL): 500  Number of Repetitions (IS): 10  Chest Tube Safety:   all connections secured   suction checked   rubber-tipped hemostat(s) with patient  Patient Tolerance (IS): fair   Goal Outcome Evaluation:

## 2022-08-12 NOTE — ANESTHESIA PROCEDURE NOTES
Airway       Patient location during procedure: OR       Procedure Start/Stop Times: 8/12/2022 9:31 AM  Staff -        CRNA: Terri Bernstein APRN CRNA       Performed By: CRNA  Consent for Airway        Urgency: elective  Indications and Patient Condition       Indications for airway management: rajendra-procedural       Induction type:intravenous       Mask difficulty assessment: 1 - vent by mask    Final Airway Details       Final airway type: endotracheal airway       Successful airway: ETT - double lumen left  Endotracheal Airway Details        Cuffed: yes       Successful intubation technique: direct laryngoscopy       DL Blade Type: [place] 2       Grade View of Cords: 1       Adjucts: stylet       Position: Right       Measured from: gums/teeth       Secured at (cm): 29       ETT Double lumen (fr): 35    Post intubation assessment        Placement verified by: capnometry, equal breath sounds and chest rise        Number of attempts at approach: 1       Secured with: silk tape       Ease of procedure: easy       Dentition: Intact and Unchanged    Medication(s) Administered   Medication Administration Time: 8/12/2022 9:31 AM

## 2022-08-12 NOTE — PLAN OF CARE
Problem: Plan of Care - These are the overarching goals to be used throughout the patient stay.    Goal: Optimal Comfort and Wellbeing  Outcome: Ongoing, Progressing  Intervention: Monitor Pain and Promote Comfort  Recent Flowsheet Documentation  Taken 8/12/2022 0038 by Sima Donis RN  Pain Management Interventions: medication (see MAR)     Problem: Pain Acute  Goal: Acceptable Pain Control and Functional Ability  Outcome: Ongoing, Progressing  Intervention: Develop Pain Management Plan  Recent Flowsheet Documentation  Taken 8/12/2022 0038 by Sima Donis RN  Pain Management Interventions: medication (see MAR)  Intervention: Prevent or Manage Pain  Recent Flowsheet Documentation  Taken 8/12/2022 0038 by Sima Donis RN  Medication Review/Management: medications reviewed     Problem: Gas Exchange Impaired  Goal: Optimal Gas Exchange  Outcome: Ongoing, Progressing     Problem: Respiratory Compromise (Pneumonia)  Goal: Effective Oxygenation and Ventilation  Outcome: Ongoing, Progressing   Goal Outcome Evaluation:    Pt stable this shift.  Slept between cares.  Reported slight back pain and received tylenol with good effect.   Lung sounds diminished on right.  Continues on oxygen 2liters to maintain sats overnight.  Pt npo in preparation for surgery this am.    0700-received word that pt to go down early for surgery.  Cefipime infused and vancomycin currently running.  Pt up in shower, voided and vitals done.

## 2022-08-12 NOTE — ANESTHESIA CARE TRANSFER NOTE
Patient: Elaina Charles    Procedure: Procedure(s):  THORACOSCOPY       Diagnosis: Empyema (H) [J86.9]  Diagnosis Additional Information: No value filed.    Anesthesia Type:   General     Note:    Oropharynx: oropharynx clear of all foreign objects and spontaneously breathing  Level of Consciousness: awake  Oxygen Supplementation: face mask  Level of Supplemental Oxygen (L/min / FiO2): 8  Independent Airway: airway patency satisfactory and stable  Dentition: dentition unchanged  Vital Signs Stable: post-procedure vital signs reviewed and stable  Report to RN Given: handoff report given  Patient transferred to: PACU    Handoff Report: Identifed the Patient, Identified the Reponsible Provider, Reviewed the pertinent medical history, Discussed the surgical course, Reviewed Intra-OP anesthesia mangement and issues during anesthesia, Set expectations for post-procedure period and Allowed opportunity for questions and acknowledgement of understanding      Vitals:  Vitals Value Taken Time   /87 08/12/22 1100   Temp     Pulse 102 08/12/22 1102   Resp 101 08/12/22 1102   SpO2 97 % 08/12/22 1102   Vitals shown include unvalidated device data.    Electronically Signed By: KIT Mast CRNA  August 12, 2022  11:03 AM

## 2022-08-12 NOTE — PROGRESS NOTES
PULMONARY MEDICINE PROGRESS NOTE  8/11/2022    Admit Date: 8/10/2022  CODE: Full Code    Reason for Consult: right empyema    Assessment/Plan:   56 year old female with a history of tobacco dependence, recent right-sided pneumonia, presented on 8/10 with right lateral chest pain, found to have loculated right pleural with gas bubbles, s/p right thoracentesis on 8/11 and right thoracoscopic decortication on 8/12.    Loculated right pleural effusion: Right chest pain, occurring after recent pneumonia, loculated small amount of fluid with gas bubbles, clinically and radiographically consistent with right empyema. Patient denies any recent dental or periodontal disease. Right thoracentesis on 8/11 with 2 mL removed, clotted so unable to perform cell differential, but glucose level 11 consistent with empyema. S/p right thoracoscopic decortication on 8/12 confirming empyema with purulent material decorticated.    Plan:  - appreciate surgery consultation and care  - right chest tubes x 3 per surgery, minimal air leak from one tube  - pain control, IS, PT/OT  - continue cefepime and vanco for now, cultures in process  - right pleural pathology in process  - tobacco cessation counseling  - pulmonary consult service will follow with you    Miguel Johnson MD  Pulmonary and Critical Care Medicine  [facility]  Vocera  Office [phone]  Pager [phone]  (he/him/his)                                                                                                                                                        SUBJECTIVE/INTERVAL HISTORY   Underwent right thoracoscopic decortication today. Has pain but controlled with meds. Trying to do IS.                                                                                                                                                    Exam/Data:     Vitals  /61 (BP Location: Right arm)   Pulse 88   Temp 98.6  F (37  C) (Oral)   Resp 19   Ht  "1.575 m (5' 2\")   Wt 58.5 kg (129 lb)   SpO2 90%   BMI 23.59 kg/m    BP - Mean:  [] 92  I/O last 3 completed shifts:  In: 240 [P.O.:240]  Out: 2800 [Urine:2800]  Weight change: -0.907 kg (-2 lb)  [unfilled]  EXAM:  /61 (BP Location: Right arm)   Pulse 88   Temp 98.6  F (37  C) (Oral)   Resp 19   Ht 1.575 m (5' 2\")   Wt 58.5 kg (129 lb)   SpO2 90%   BMI 23.59 kg/m      Intake/Output last 3 shifts:  I/O last 3 completed shifts:  In: 240 [P.O.:240]  Out: 2800 [Urine:2800]  Intake/Output this shift:  I/O this shift:  In: -   Out: 337 [Blood:10; Chest Tube:327]    Physical Exam  Gen: alert, oriented, has right chest pain but getting pain meds  HEENT: NT, no JUSTIN  CV: RRR, no m/g/r  Resp: diminished on right, right chest tubes x 3 with serosanguinous output, one has small intermittent air leak  Abd: soft, nontender, BS+  Skin: no rashes or lesions  Ext: no edema  Neuro: PERRL, nonfocal exam    ROS: A complete 10-system review of systems was obtained and is negative with the exception of what is noted in the history of present illness.    Medications:       disposable pump w/ anesthetic ([facility])       dextrose 5% and 0.45% NaCl         acetaminophen  975 mg Oral Q8H     ceFEPIme (MAXIPIME) IV  2 g Intravenous Q8H     [START ON 8/13/2022] enoxaparin ANTICOAGULANT  40 mg Subcutaneous Q24H     nicotine   Transdermal Q8H     [START ON 8/13/2022] polyethylene glycol  17 g Oral Daily     senna-docusate  1 tablet Oral BID     sodium chloride (PF)  3 mL Intracatheter Q8H     sodium chloride (PF)  3 mL Intracatheter Q8H     vancomycin  1,000 mg Intravenous Q12H         DATA  All laboratory and radiology has been personally reviewed by myself today.  Recent Labs   Lab 08/12/22  0624   WBC 13.1*   HGB 11.9   HCT 37.4   *     Recent Labs   Lab 08/12/22  0624 08/11/22  0603 08/10/22  1310    138 136   CO2 31 27 30   BUN 8 9 7*       PFT DATA:  None available    IMAGING:   Chest CT (8/8/22):  - images " directly reviewed, formal interpretation follows:  FINDINGS:   LUNGS AND PLEURA: Right middle and lower lobe consolidation with air bronchograms compatible with pneumonia. Anterior and posterior right pleural thickening consistent with loculated effusion. Several dots of air anterior pleura could be due to recent   procedure, or empyema.     Emphysema.     Left lower lobe subpleural 6 mm part solid nodule (series 5, image 183). Nearby 4 mm left lower lobe peripheral nodule (image 191). Left upper lobe 5 mm subpleural triangular nodule (image 110).     Right apical 6 mm nodule with tethered pleura (image 50).     MEDIASTINUM/AXILLAE: Pretracheal adenopathy measures 2.3 x 1.3 cm (4, 68). There are additional smaller mediastinal nodes.     CORONARY ARTERY CALCIFICATION: None.     UPPER ABDOMEN: Left adrenal irlanda 1.7 cm adenoma.     MUSCULOSKELETAL: Unremarkable.                                                                      IMPRESSION:   1.  Right middle and lower lobe consolidation consistent with pneumonia.  2.  Loculated right pleural effusions. Several dots of air in the anterior pleural fluid could be due to recent instrumentation, or empyema.  3.  Indeterminate bilateral 4 to 6 mm nodules. Follow-up guidelines below.  4.  Emphysema.    CXR (8/12/22):  - images directly reviewed, formal interpretation follows:  IMPRESSION: Status post right thoracoscopy with decortication. Low lung volumes. Interval placement of two large bore right chest tubes with the tips projecting over the right lung apex. No pneumothorax. Opacification of the right mid to lower hemithorax   likely reflecting a combination of pleural fluid and adjacent atelectasis/infiltrates. The left lung is clear. Normal heart size.

## 2022-08-13 ENCOUNTER — APPOINTMENT (OUTPATIENT)
Dept: RADIOLOGY | Facility: HOSPITAL | Age: 57
DRG: 163 | End: 2022-08-13
Attending: SURGERY
Payer: COMMERCIAL

## 2022-08-13 LAB
MRSA DNA SPEC QL NAA+PROBE: NEGATIVE
SA TARGET DNA: NEGATIVE

## 2022-08-13 PROCEDURE — 258N000003 HC RX IP 258 OP 636: Performed by: HOSPITALIST

## 2022-08-13 PROCEDURE — 250N000013 HC RX MED GY IP 250 OP 250 PS 637: Performed by: SURGERY

## 2022-08-13 PROCEDURE — 250N000011 HC RX IP 250 OP 636: Performed by: SURGERY

## 2022-08-13 PROCEDURE — 87641 MR-STAPH DNA AMP PROBE: CPT | Performed by: HOSPITALIST

## 2022-08-13 PROCEDURE — 99232 SBSQ HOSP IP/OBS MODERATE 35: CPT | Performed by: HOSPITALIST

## 2022-08-13 PROCEDURE — 120N000001 HC R&B MED SURG/OB

## 2022-08-13 PROCEDURE — 87205 SMEAR GRAM STAIN: CPT | Performed by: NURSE PRACTITIONER

## 2022-08-13 PROCEDURE — 71045 X-RAY EXAM CHEST 1 VIEW: CPT

## 2022-08-13 PROCEDURE — 250N000011 HC RX IP 250 OP 636: Performed by: HOSPITALIST

## 2022-08-13 RX ADMIN — ACETAMINOPHEN 325 MG: 325 TABLET ORAL at 10:18

## 2022-08-13 RX ADMIN — SENNOSIDES AND DOCUSATE SODIUM 1 TABLET: 50; 8.6 TABLET ORAL at 08:09

## 2022-08-13 RX ADMIN — CEFEPIME 2 G: 2 INJECTION, POWDER, FOR SOLUTION INTRAVENOUS at 05:54

## 2022-08-13 RX ADMIN — HYDROMORPHONE HYDROCHLORIDE 0.5 MG: 1 INJECTION, SOLUTION INTRAMUSCULAR; INTRAVENOUS; SUBCUTANEOUS at 06:53

## 2022-08-13 RX ADMIN — ACETAMINOPHEN 975 MG: 325 TABLET ORAL at 20:02

## 2022-08-13 RX ADMIN — CEFEPIME 2 G: 2 INJECTION, POWDER, FOR SOLUTION INTRAVENOUS at 14:14

## 2022-08-13 RX ADMIN — HYDROMORPHONE HYDROCHLORIDE 0.5 MG: 1 INJECTION, SOLUTION INTRAMUSCULAR; INTRAVENOUS; SUBCUTANEOUS at 18:07

## 2022-08-13 RX ADMIN — ACETAMINOPHEN 975 MG: 325 TABLET ORAL at 04:43

## 2022-08-13 RX ADMIN — ENOXAPARIN SODIUM 40 MG: 40 INJECTION SUBCUTANEOUS at 23:06

## 2022-08-13 RX ADMIN — METRONIDAZOLE 500 MG: 500 INJECTION, SOLUTION INTRAVENOUS at 04:44

## 2022-08-13 RX ADMIN — VANCOMYCIN HYDROCHLORIDE 1250 MG: 5 INJECTION, POWDER, LYOPHILIZED, FOR SOLUTION INTRAVENOUS at 20:04

## 2022-08-13 RX ADMIN — METRONIDAZOLE 500 MG: 500 INJECTION, SOLUTION INTRAVENOUS at 17:59

## 2022-08-13 RX ADMIN — SENNOSIDES AND DOCUSATE SODIUM 1 TABLET: 50; 8.6 TABLET ORAL at 20:03

## 2022-08-13 RX ADMIN — ACETAMINOPHEN 975 MG: 325 TABLET ORAL at 14:13

## 2022-08-13 RX ADMIN — POLYETHYLENE GLYCOL 3350 17 G: 17 POWDER, FOR SOLUTION ORAL at 08:09

## 2022-08-13 RX ADMIN — HYDROMORPHONE HYDROCHLORIDE 0.5 MG: 1 INJECTION, SOLUTION INTRAMUSCULAR; INTRAVENOUS; SUBCUTANEOUS at 10:18

## 2022-08-13 RX ADMIN — VANCOMYCIN HYDROCHLORIDE 1250 MG: 5 INJECTION, POWDER, LYOPHILIZED, FOR SOLUTION INTRAVENOUS at 06:40

## 2022-08-13 RX ADMIN — HYDROMORPHONE HYDROCHLORIDE 4 MG: 4 TABLET ORAL at 08:09

## 2022-08-13 RX ADMIN — CEFEPIME 2 G: 2 INJECTION, POWDER, FOR SOLUTION INTRAVENOUS at 23:05

## 2022-08-13 ASSESSMENT — ACTIVITIES OF DAILY LIVING (ADL)
ADLS_ACUITY_SCORE: 22

## 2022-08-13 NOTE — PROGRESS NOTES
Progress Note    Assessment/Plan  Continue chest tubes today.  Patient with good respiratory effort.  No airleak chest tube output noted.  Allow ambulation and sitting up in chair on waterseal okay.  Chest x-ray noted.    Active Problems:    Empyema (H)    HCAP (healthcare-associated pneumonia)    Failure of outpatient treatment      Subjective  Reasonably comfortable  Objective    Vital signs in last 24 hours  Temp:  [97.2  F (36.2  C)-98.7  F (37.1  C)] 97.8  F (36.6  C)  Pulse:  [] 74  Resp:  [17-28] 18  BP: ()/(54-87) 99/54  SpO2:  [90 %-98 %] 90 %  Weight:   [unfilled]    Intake/Output last 3 shifts  I/O last 3 completed shifts:  In: 664 [I.V.:664]  Out: 864 [Urine:400; Blood:10; Chest Tube:454]  Intake/Output this shift:  No intake/output data recorded.      Physical Exam  Good respiratory effort breath sounds better right side and preop    Pertinent Labs   Lab Results   Component Value Date    WBC 13.1 (H) 08/12/2022    HGB 11.9 08/12/2022    HCT 37.4 08/12/2022    MCV 89 08/12/2022     (H) 08/12/2022             Pertinent Radiology     [unfilled]        Serg Alvarez MD

## 2022-08-13 NOTE — PROGRESS NOTES
[facility] Hospitalist Progress Note  [facility]  Admission date: 8/10/2022    Summary:    56F with R sided PNA and loculated R pleural effusion with air now POD#0 s/p thorascopic decortication for empyema    Assessment/Plan    #RLL PNA with empyema - Cultures and path pending  -cefepime/flagyl/vanco pending culture results.  Drop Vanco if no MRSA (check nasal swab).  Plan 28days Augmentin unless cultures dictate otherwise.     #tobacco abuse- smoking cessation  #incidental pulm nodules - pulm f/u  #acute pain - controlled currently.  #acute hypoxic respiratory failure - due to #1.  O2  #tobacco abuse- NRT.  Cessation.    Checklist:  Code Status: Full Code    Diet: Regular Diet Adult    Travis Catheter: Not present  Central Lines: None  DVT px:  Enoxaparin (Lovenox) SQ        Auto-populated based on system request - if relevant they will be addressed above:  Clinically Significant Risk Factors Present on Admission                     Auto-populated from discharge tab:     Expected Discharge Date: 08/16/2022    Discharge Delays: Procedure Pending (enter procedure & time in comments)    Discharge Comments: IV antibiotics, Pulmonary consult  thoractomy 8/12         Overnight Events/Subjective/Notable results:  Seen earlie  Pain controlled.  No SOB.  No air leak    4 point ROS otherwise negative    Objective    Vital signs in last 24 hours  Temp:  [97.4  F (36.3  C)-98.7  F (37.1  C)] 98.1  F (36.7  C)  Pulse:  [70-90] 71  Resp:  [18-20] 18  BP: ()/(50-69) 99/50  SpO2:  [90 %-94 %] 94 % O2 Device: Nasal cannula    Weight:   129 lbs 0 oz    Intake/Output last 3 shifts  I/O last 3 completed shifts:  In: 664 [I.V.:664]  Out: 864 [Urine:400; Blood:10; Chest Tube:454]  Body mass index is 23.59 kg/m .    Physical Exam  General:  Alert, cooperative, no distress    Neurologic:  oriented, facial symmetry preserved, fluent speech.   Psych: calm, mood and affect appropriate to situation  HEENT:  Anicteric,  MMM  CV: RRR no MRG, normal S1 and S2, no edema  Lungs: CTAB.  Easyrespirations  Abd: soft, NT, normoactive BS  Skin: no rashes or jaundice noted on exposed skin.    Central Lines and Tubes: chest tubes without leak    I have reviewed all labs, medications, imaging studies in the last 24 hours.  Pertinent findings&changes discussed above.    Data     Discussed with: Dr. Maribel Medel MD  Internal Medicine Hospitalist

## 2022-08-13 NOTE — PLAN OF CARE
Problem: Plan of Care - These are the overarching goals to be used throughout the patient stay.    Goal: Plan of Care Review/Shift Note  Outcome: Ongoing, Progressing  Flowsheets (Taken 8/13/2022 0015)  Plan of Care Reviewed With: patient  Goal: Patient-Specific Goal (Individualized)  Outcome: Ongoing, Progressing  Goal: Absence of Hospital-Acquired Illness or Injury  Outcome: Ongoing, Progressing  Intervention: Identify and Manage Fall Risk  Recent Flowsheet Documentation  Taken 8/12/2022 1620 by Stacy Puri, RN  Safety Promotion/Fall Prevention:   safety round/check completed   room organization consistent   room door open   patient and family education   nonskid shoes/slippers when out of bed   assistive device/personal items within reach   clutter free environment maintained  Intervention: Prevent Skin Injury  Recent Flowsheet Documentation  Taken 8/12/2022 1756 by Stacy Puri RN  Body Position:   position changed independently   supine, head elevated  Intervention: Prevent and Manage VTE (Venous Thromboembolism) Risk  Recent Flowsheet Documentation  Taken 8/12/2022 1756 by Stacy Puri RN  Activity Management:   activity encouraged   up to commode  Goal: Optimal Comfort and Wellbeing  Outcome: Ongoing, Progressing  Intervention: Monitor Pain and Promote Comfort  Recent Flowsheet Documentation  Taken 8/12/2022 1955 by Stacy Puri RN  Pain Management Interventions: medication (see MAR)  Taken 8/12/2022 1903 by Stacy Puri RN  Pain Management Interventions: medication (see MAR)  Taken 8/12/2022 1756 by Stacy Puri, RN  Pain Management Interventions: medication (see MAR)  Taken 8/12/2022 1620 by Stacy Puri RN  Pain Management Interventions: cold applied  Goal: Readiness for Transition of Care  Outcome: Ongoing, Progressing  Flowsheets (Taken 8/13/2022 0015)  Barriers to Discharge: Chest tubes x3     Problem: Pain Acute  Goal: Acceptable Pain Control and Functional  Ability  Outcome: Ongoing, Progressing  Intervention: Develop Pain Management Plan  Recent Flowsheet Documentation  Taken 8/12/2022 1955 by Stacy Puri, RN  Pain Management Interventions: medication (see MAR)  Taken 8/12/2022 1903 by Stacy Puri, RN  Pain Management Interventions: medication (see MAR)  Taken 8/12/2022 1756 by Stacy Puri, RN  Pain Management Interventions: medication (see MAR)  Taken 8/12/2022 1620 by Stacy Puri, RN  Pain Management Interventions: cold applied  Intervention: Prevent or Manage Pain  Recent Flowsheet Documentation  Taken 8/12/2022 1620 by Stacy Puri, RN  Medication Review/Management:   medications reviewed   high-risk medications identified   provider consulted     Problem: Infection (Pneumonia)  Goal: Resolution of Infection Signs and Symptoms  Outcome: Ongoing, Progressing   Goal Outcome Evaluation:    Plan of Care Reviewed With: patient     3 chest tubes Right side to -20 suction. PO Dilaudid 4 mg ineffective for 7/10 Right back pain from chest tubes, still rating at 7/10 hour later, updated Dr. Carlson. IV Dilaudid effective, down to 2/10. Also taking scheduled tylenol & using ice pack. IV fluids discontinued on shift, good PO fluid intake. Receiving Cefepime, Vanco & Flagyl. Dressing CDI. 0 mL out from Chest tubes #1&2 (joined) on shift & 31 mL out from chest tube #3 on shift. O2 sats 90-95% on 2L NC, continuous pulse ox intact. Using bedside commode with staff assistance, using call light. Using incentive spirometer, only up to 500. Diminished lung sounds.

## 2022-08-13 NOTE — PROGRESS NOTES
Pulmonary brief update    Patient's vitals are stable.  Appreciate Dr. Alvarez's post-op management  Dr. Medel planning for 28d of augmentin for empyema which sounds appropriate.    Our service will sign off, please call with additional questions.  Discussed with Dr. Medel.    Wali (Constantin) MD Maribel  [facility] [phone]  Fax [phone]

## 2022-08-13 NOTE — PLAN OF CARE
Problem: Plan of Care - These are the overarching goals to be used throughout the patient stay.    Goal: Optimal Comfort and Wellbeing  Outcome: Ongoing, Progressing   Goal Outcome Evaluation:      Pain control with scheduled Tylenol.

## 2022-08-13 NOTE — PLAN OF CARE
Problem: Gas Exchange Impaired  Goal: Optimal Gas Exchange  Outcome: Ongoing, Progressing   Goal Outcome Evaluation:  Patient complaining of right sided back pain.  Taking dilaudid and tylenol for pain relief.  Chest tubes intact to suction.  No air leak noted.  Chest tube #1 and #2 y-sited output 30cc.  Chest tube #3 output 16cc.  Maintaining sats on 2L NC.  Dyspnea with activity.  Ambulating with SBA.  Up in chair.  Using IS.

## 2022-08-14 ENCOUNTER — APPOINTMENT (OUTPATIENT)
Dept: RADIOLOGY | Facility: HOSPITAL | Age: 57
DRG: 163 | End: 2022-08-14
Attending: SURGERY
Payer: COMMERCIAL

## 2022-08-14 PROCEDURE — 250N000011 HC RX IP 250 OP 636: Performed by: SURGERY

## 2022-08-14 PROCEDURE — 120N000001 HC R&B MED SURG/OB

## 2022-08-14 PROCEDURE — 99232 SBSQ HOSP IP/OBS MODERATE 35: CPT | Performed by: HOSPITALIST

## 2022-08-14 PROCEDURE — 71045 X-RAY EXAM CHEST 1 VIEW: CPT

## 2022-08-14 PROCEDURE — 250N000013 HC RX MED GY IP 250 OP 250 PS 637: Performed by: SURGERY

## 2022-08-14 PROCEDURE — 250N000011 HC RX IP 250 OP 636: Performed by: HOSPITALIST

## 2022-08-14 RX ADMIN — ACETAMINOPHEN 975 MG: 325 TABLET ORAL at 04:34

## 2022-08-14 RX ADMIN — SENNOSIDES AND DOCUSATE SODIUM 1 TABLET: 50; 8.6 TABLET ORAL at 09:34

## 2022-08-14 RX ADMIN — CEFEPIME 2 G: 2 INJECTION, POWDER, FOR SOLUTION INTRAVENOUS at 22:41

## 2022-08-14 RX ADMIN — SENNOSIDES AND DOCUSATE SODIUM 1 TABLET: 50; 8.6 TABLET ORAL at 19:33

## 2022-08-14 RX ADMIN — ACETAMINOPHEN 975 MG: 325 TABLET ORAL at 19:33

## 2022-08-14 RX ADMIN — HYDROMORPHONE HYDROCHLORIDE 0.5 MG: 1 INJECTION, SOLUTION INTRAMUSCULAR; INTRAVENOUS; SUBCUTANEOUS at 11:27

## 2022-08-14 RX ADMIN — POLYETHYLENE GLYCOL 3350 17 G: 17 POWDER, FOR SOLUTION ORAL at 09:34

## 2022-08-14 RX ADMIN — METRONIDAZOLE 500 MG: 500 INJECTION, SOLUTION INTRAVENOUS at 04:34

## 2022-08-14 RX ADMIN — CEFEPIME 2 G: 2 INJECTION, POWDER, FOR SOLUTION INTRAVENOUS at 15:44

## 2022-08-14 RX ADMIN — HYDROMORPHONE HYDROCHLORIDE 4 MG: 4 TABLET ORAL at 09:34

## 2022-08-14 RX ADMIN — METRONIDAZOLE 500 MG: 500 INJECTION, SOLUTION INTRAVENOUS at 17:28

## 2022-08-14 RX ADMIN — ENOXAPARIN SODIUM 40 MG: 40 INJECTION SUBCUTANEOUS at 22:41

## 2022-08-14 RX ADMIN — HYDROMORPHONE HYDROCHLORIDE 0.5 MG: 1 INJECTION, SOLUTION INTRAMUSCULAR; INTRAVENOUS; SUBCUTANEOUS at 04:34

## 2022-08-14 RX ADMIN — CEFEPIME 2 G: 2 INJECTION, POWDER, FOR SOLUTION INTRAVENOUS at 05:56

## 2022-08-14 RX ADMIN — ACETAMINOPHEN 975 MG: 325 TABLET ORAL at 11:33

## 2022-08-14 ASSESSMENT — ACTIVITIES OF DAILY LIVING (ADL)
ADLS_ACUITY_SCORE: 22

## 2022-08-14 NOTE — PLAN OF CARE
Problem: Plan of Care - These are the overarching goals to be used throughout the patient stay.    Goal: Plan of Care Review/Shift Note  Outcome: Ongoing, Progressing  Flowsheets (Taken 8/13/2022 2344)  Plan of Care Reviewed With: patient  Overall Patient Progress: improving  Goal: Patient-Specific Goal (Individualized)  Outcome: Ongoing, Progressing  Goal: Absence of Hospital-Acquired Illness or Injury  Outcome: Ongoing, Progressing  Intervention: Identify and Manage Fall Risk  Recent Flowsheet Documentation  Taken 8/13/2022 1550 by Stacy Puri, RN  Safety Promotion/Fall Prevention:    nonskid shoes/slippers when out of bed    patient and family education    room door open    room organization consistent    safety round/check completed    assistive device/personal items within reach    clutter free environment maintained  Goal: Optimal Comfort and Wellbeing  Outcome: Ongoing, Progressing  Intervention: Monitor Pain and Promote Comfort  Recent Flowsheet Documentation  Taken 8/13/2022 2002 by Stacy Puri, RN  Pain Management Interventions: medication (see MAR)  Taken 8/13/2022 1807 by Stacy Puri RN  Pain Management Interventions:    cold applied    medication (see MAR)  Taken 8/13/2022 1550 by Stacy Puri RN  Pain Management Interventions: cold applied  Goal: Readiness for Transition of Care  Outcome: Ongoing, Progressing     Problem: Pain Acute  Goal: Acceptable Pain Control and Functional Ability  Outcome: Ongoing, Progressing  Intervention: Develop Pain Management Plan  Recent Flowsheet Documentation  Taken 8/13/2022 2002 by Stacy Puri, RN  Pain Management Interventions: medication (see MAR)  Taken 8/13/2022 1807 by Stacy Puri RN  Pain Management Interventions:    cold applied    medication (see MAR)  Taken 8/13/2022 1550 by Stacy Puri, RN  Pain Management Interventions: cold applied  Intervention: Prevent or Manage Pain  Recent Flowsheet Documentation  Taken 8/13/2022  1550 by Stacy Puri, RN  Medication Review/Management: medications reviewed     Problem: Infection (Pneumonia)  Goal: Resolution of Infection Signs and Symptoms  Outcome: Ongoing, Progressing   Goal Outcome Evaluation:    Plan of Care Reviewed With: patient     Overall Patient Progress: improving    Tolerating Right upper back pain with PRN IV Dilaudid given once on shift, also taking scheduled tylenol & using ice packs. IV extravasated on shift while Vanco running, approx few inch area of slight redness/edema/pain, aspirated 0.5 mL Vanco out, removed IV, placed warm compress, elevated arm on pillow & notified Dr. Dinero who came to see patient. Negative MRSA MSSA PCR nasal swab, notified Dr. Dinero & Vanco discontinued per Dr. Medel's note. Changed chest tube dressing, no signs of infection, patient tolerated well. Continues on 2L O2 via NC to keep sats WNL, continuous pulse ox intact. 3 chest tubes to suction, no air leaks. 30 mL out of #1&2 and 26 mL out of #3. New IV placed for other 2 IV ABX.

## 2022-08-14 NOTE — PROGRESS NOTES
Progress Note    Assessment/Plan  Continue chest tubes today.  Probable removal tomorrow patient doing very well at this point.  No airleak.  Probable discharge tomorrow after chest tube removal    Active Problems:    Empyema (H)    HCAP (healthcare-associated pneumonia)    Failure of outpatient treatment      Subjective  Quite comfortable  Objective    Vital signs in last 24 hours  Temp:  [97.7  F (36.5  C)-98.4  F (36.9  C)] 98  F (36.7  C)  Pulse:  [67-83] 67  Resp:  [16-19] 16  BP: ()/(50-59) 101/54  SpO2:  [92 %-95 %] 95 %  Weight:   [unfilled]    Intake/Output last 3 shifts  I/O last 3 completed shifts:  In: 1670 [P.O.:1250; I.V.:420]  Out: 1604 [Urine:1500; Chest Tube:104]  Intake/Output this shift:  No intake/output data recorded.      Physical Exam  No obvious air leak good respiratory effort chest tube output decreasing    Pertinent Labs   Lab Results   Component Value Date    WBC 13.1 (H) 08/12/2022    HGB 11.9 08/12/2022    HCT 37.4 08/12/2022    MCV 89 08/12/2022     (H) 08/12/2022             Pertinent Radiology     [unfilled]        Serg Alvarez MD       Progress Note    Assessment/Plan      Active Problems:    Empyema (H)    HCAP (healthcare-associated pneumonia)    Failure of outpatient treatment      Subjective    Objective    Vital signs in last 24 hours  Temp:  [97.7  F (36.5  C)-98.4  F (36.9  C)] 98  F (36.7  C)  Pulse:  [67-83] 67  Resp:  [16-19] 16  BP: ()/(50-59) 101/54  SpO2:  [92 %-95 %] 95 %  Weight:   [unfilled]    Intake/Output last 3 shifts  I/O last 3 completed shifts:  In: 1670 [P.O.:1250; I.V.:420]  Out: 1604 [Urine:1500; Chest Tube:104]  Intake/Output this shift:  No intake/output data recorded.      Physical Exa    Pertinent Labs   Lab Results   Component Value Date    WBC 13.1 (H) 08/12/2022    HGB 11.9 08/12/2022    HCT 37.4 08/12/2022    MCV 89 08/12/2022     (H) 08/12/2022             Pertinent Radiology     [unfilled]        Serg HORNE  MD Antonio

## 2022-08-14 NOTE — PLAN OF CARE
Problem: Plan of Care - These are the overarching goals to be used throughout the patient stay.    Goal: Optimal Comfort and Wellbeing  Outcome: Ongoing, Progressing  Intervention: Monitor Pain and Promote Comfort  Recent Flowsheet Documentation  Taken 8/14/2022 0000 by Jose Raul Kim, RN  Pain Management Interventions: emotional support   Goal Outcome Evaluation:      Pain control with scheduled Tylenol and PRN IV Dilaudid.

## 2022-08-14 NOTE — SIGNIFICANT EVENT
Significant Event Note    Time of event: 10:00 pm August 13, 2022    Description of event:  Called to pt bedside for IV infiltration while running Vancomycin this evening. Per primary teams note from 8/13, there is plan in place to discontinue the vancomycin if MRSA testing returns negative. Per chart review sputum sample has returned negative for this. The IV site is well appearing with very little erythema and no fluctuance or induration. The site is not warm to the touch.    Plan:  - Vancomycin discontinued  - Plan was to start Augmentin as mentioned in previous note by the day team, however upon review pt does have an allergy to penicillins. Reported affect is rash/swelling. Will defer abx regimen to the day team as she is well appearing and vitals are within normal limits at the time.    Discussed with: bedside nurse    Parish Dinero MD

## 2022-08-14 NOTE — PLAN OF CARE
Problem: Respiratory Compromise (Pneumonia)  Goal: Effective Oxygenation and Ventilation  Outcome: Ongoing, Progressing  Intervention: Promote Airway Secretion Clearance  Recent Flowsheet Documentation  Taken 8/14/2022 1514 by Zollinger, Nancy K, RN  Cough And Deep Breathing: done independently per patient  Taken 8/14/2022 1000 by Zollinger, Nancy K, RN  Cough And Deep Breathing: done independently per patient   Goal Outcome Evaluation:  Patient complaining of right sided back pain.  Scheduled tylenol and prn dilaudid helpful for pain relief.  Chest tubes to suction.  No air leak noted.  On-Q pump intact.  Maintaining sats on 2L NC.  Using IS.  Up in chair for majority of day.  Ambulated in hallway, tolerated well.

## 2022-08-14 NOTE — PROGRESS NOTES
[facility] Hospitalist Progress Note  [facility]  Admission date: 8/10/2022    Summary:    56F with R sided PNA and loculated R pleural effusion with air now POD#0 s/p thorascopic decortication for empyema    Assessment/Plan    #RLL PNA with empyema - Cultures and path pending.  Sputum culture from 08/13 with GNR.  -cefepime+flagyl pending culture results.  No MRSA and swab negative,vanco stopped.  Plan 28days omnicef+flagyl (amox allergy) unless cultures dictate otherwise.     #tobacco abuse- smoking cessation  #incidental pulm nodules - pulm f/u  #acute pain - controlled currently.  #acute hypoxic respiratory failure - due to #1.  O2  #tobacco abuse- NRT.  Cessation.    Checklist:  Code Status: Full Code    Diet: Regular Diet Adult    Travis Catheter: Not present  Central Lines: None  DVT px:  Enoxaparin (Lovenox) SQ        Auto-populated based on system request - if relevant they will be addressed above:  Clinically Significant Risk Factors Present on Admission                     Auto-populated from discharge tab:     Expected Discharge Date: 08/16/2022    Discharge Delays: Procedure Pending (enter procedure & time in comments)    Discharge Comments: IV antibiotics, Pulmonary consult  thoractomy 8/12         Overnight Events/Subjective/Notable results:    Pain controlled.  No SOB.  No air leak    4 point ROS otherwise negative    Objective    Vital signs in last 24 hours  Temp:  [97.7  F (36.5  C)-98.4  F (36.9  C)] 98  F (36.7  C)  Pulse:  [70-83] 74  Resp:  [16-19] 16  BP: ()/(50-59) 98/54  SpO2:  [92 %-95 %] 92 % O2 Device: Nasal cannula    Weight:   129 lbs 0 oz    Intake/Output last 3 shifts  I/O last 3 completed shifts:  In: 1190 [P.O.:770; I.V.:420]  Out: 2002 [Urine:1900; Chest Tube:102]  Body mass index is 23.59 kg/m .    Physical Exam  General:  Alert, cooperative, no distress    Neurologic:  oriented, facial symmetry preserved, fluent speech.   Psych: calm, mood and affect  appropriate to situation  HEENT:  Anicteric, MMM  CV: RRR no MRG, normal S1 and S2, no edema  Lungs: CTAB.  Easyrespirations  Abd: soft, NT, normoactive BS  Skin: no rashes or jaundice noted on exposed skin.    Central Lines and Tubes: chest tubes without leak    I have reviewed all labs, medications, imaging studies in the last 24 hours.  Pertinent findings&changes discussed above.    Data       Ceci Medel MD  Internal Medicine Hospitalist

## 2022-08-15 ENCOUNTER — APPOINTMENT (OUTPATIENT)
Dept: RADIOLOGY | Facility: HOSPITAL | Age: 57
DRG: 163 | End: 2022-08-15
Attending: SURGERY
Payer: COMMERCIAL

## 2022-08-15 ENCOUNTER — DOCUMENTATION ONLY (OUTPATIENT)
Dept: MEDSURG UNIT | Facility: HOSPITAL | Age: 57
End: 2022-08-15

## 2022-08-15 VITALS
TEMPERATURE: 98.8 F | HEIGHT: 62 IN | DIASTOLIC BLOOD PRESSURE: 59 MMHG | BODY MASS INDEX: 23.74 KG/M2 | HEART RATE: 71 BPM | RESPIRATION RATE: 18 BRPM | WEIGHT: 129 LBS | SYSTOLIC BLOOD PRESSURE: 112 MMHG | OXYGEN SATURATION: 95 %

## 2022-08-15 LAB
BACTERIA BLD CULT: NO GROWTH
HOLD SPECIMEN: NORMAL
PATH REPORT.COMMENTS IMP SPEC: NORMAL
PATH REPORT.COMMENTS IMP SPEC: NORMAL
PATH REPORT.FINAL DX SPEC: NORMAL
PATH REPORT.GROSS SPEC: NORMAL
PATH REPORT.MICROSCOPIC SPEC OTHER STN: NORMAL
PATH REPORT.RELEVANT HX SPEC: NORMAL
PHOTO IMAGE: NORMAL
PLATELET # BLD AUTO: 451 10E3/UL (ref 150–450)

## 2022-08-15 PROCEDURE — 250N000013 HC RX MED GY IP 250 OP 250 PS 637: Performed by: SURGERY

## 2022-08-15 PROCEDURE — 36415 COLL VENOUS BLD VENIPUNCTURE: CPT | Performed by: SURGERY

## 2022-08-15 PROCEDURE — 85049 AUTOMATED PLATELET COUNT: CPT | Performed by: SURGERY

## 2022-08-15 PROCEDURE — 99239 HOSP IP/OBS DSCHRG MGMT >30: CPT | Performed by: HOSPITALIST

## 2022-08-15 PROCEDURE — 71045 X-RAY EXAM CHEST 1 VIEW: CPT

## 2022-08-15 PROCEDURE — 250N000011 HC RX IP 250 OP 636: Performed by: HOSPITALIST

## 2022-08-15 PROCEDURE — 250N000011 HC RX IP 250 OP 636: Performed by: SURGERY

## 2022-08-15 PROCEDURE — 250N000009 HC RX 250: Performed by: SURGERY

## 2022-08-15 PROCEDURE — 71046 X-RAY EXAM CHEST 2 VIEWS: CPT

## 2022-08-15 RX ORDER — GINSENG 100 MG
CAPSULE ORAL ONCE
Status: COMPLETED | OUTPATIENT
Start: 2022-08-15 | End: 2022-08-15

## 2022-08-15 RX ORDER — LEVOFLOXACIN 750 MG/1
750 TABLET, FILM COATED ORAL DAILY
Qty: 26 TABLET | Refills: 0 | Status: SHIPPED | OUTPATIENT
Start: 2022-08-15 | End: 2022-09-12

## 2022-08-15 RX ADMIN — BACITRACIN: 500 OINTMENT TOPICAL at 09:09

## 2022-08-15 RX ADMIN — METRONIDAZOLE 500 MG: 500 INJECTION, SOLUTION INTRAVENOUS at 05:26

## 2022-08-15 RX ADMIN — ACETAMINOPHEN 975 MG: 325 TABLET ORAL at 05:26

## 2022-08-15 RX ADMIN — CEFEPIME 2 G: 2 INJECTION, POWDER, FOR SOLUTION INTRAVENOUS at 08:15

## 2022-08-15 RX ADMIN — CEFEPIME 2 G: 2 INJECTION, POWDER, FOR SOLUTION INTRAVENOUS at 16:52

## 2022-08-15 ASSESSMENT — ACTIVITIES OF DAILY LIVING (ADL)
ADLS_ACUITY_SCORE: 20
ADLS_ACUITY_SCORE: 22
ADLS_ACUITY_SCORE: 20
ADLS_ACUITY_SCORE: 22
ADLS_ACUITY_SCORE: 22
ADLS_ACUITY_SCORE: 20
ADLS_ACUITY_SCORE: 20
ADLS_ACUITY_SCORE: 22
ADLS_ACUITY_SCORE: 20
ADLS_ACUITY_SCORE: 20

## 2022-08-15 NOTE — PROGRESS NOTES
Care Management Follow Up    Length of Stay (days): 5    Expected Discharge Date: 08/16/2022     Concerns to be Addressed:       Patient plan of care discussed at interdisciplinary rounds: Yes    Anticipated Discharge Disposition:       Anticipated Discharge Services:    Anticipated Discharge DME:      Patient/family educated on Medicare website which has current facility and service quality ratings:    Education Provided on the Discharge Plan:    Patient/Family in Agreement with the Plan:      Referrals Placed by CM/SW:    Private pay costs discussed: Not applicable    Additional Information:  CM following for needs at discharge. Plan to return home when medically ready       Katie Avila RN     Care Management Discharge Note    Discharge Date: 08/15/2022       Discharge Disposition: Home    Discharge Services:      Discharge DME:      Discharge Transportation:      Private pay costs discussed: Not applicable    PAS Confirmation Code:    Patient/family educated on Medicare website which has current facility and service quality ratings:      Education Provided on the Discharge Plan:    Persons Notified of Discharge Plans: patient   Patient/Family in Agreement with the Plan:      Handoff Referral Completed: Yes    Additional Information:  Home with no care management needs         Katie Avila RN

## 2022-08-15 NOTE — PROGRESS NOTES
Oxygen Documentation:   I certify that this patient, Elaina Charles has been under my care (or a nurse practitioner or physican's assistant working with me). This is the face-to-face encounter for oxygen medical necessity.      Elaina Charles is now in a chronic stable state and continues to require supplemental oxygen. Patient has continued oxygen desaturation due to Pneumonia with empyema.    Alternative treatment(s) tried or considered and deemed clinically infective for treatment of PNA include antibiotics.  If portability is ordered, is the patient mobile within the home? yes    **Patients who qualify for home O2 coverage under the CMS guidelines require ABG tests or O2 sat readings obtained closest to, but no earlier than 2 days prior to the discharge, as evidence of the need for home oxygen therapy. Testing must be performed while patient is in the chronic stable state. See notes for O2 sats.**

## 2022-08-15 NOTE — PROGRESS NOTES
Received intake at 3:38pm. Patient lives in an area we are unable to service. Informed both patient and nurse we will have to send out patient's order to a DME that can service [place].    4pm spoke with Parish at [facility], confirmed they are able to service [place]. Faxed all of documentation to Parish.  4:20pm updated nurse, Jose Raul. Please call [facility]obi if you have any questions regarding patient's oxygen, [phone].  4:30pm [facility] has received the documentation.

## 2022-08-15 NOTE — PROGRESS NOTES
Progress Note    Assessment/Plan  All chest tubes removed.  Patient doing very well.  Discharge home later today okay with surgery.  Will obtain x-ray in 2 hours after chest tube removal.  We will see patient in clinic in 2 weeks.  No restrictions activity or diet.  Dressing change as needed.    Active Problems:    Empyema (H)    HCAP (healthcare-associated pneumonia)    Failure of outpatient treatment      Subjective  Quite comfortable  Objective    Vital signs in last 24 hours  Temp:  [98  F (36.7  C)-98.5  F (36.9  C)] 98.2  F (36.8  C)  Pulse:  [67-75] 70  Resp:  [16-18] 18  BP: ()/(54-62) 108/59  SpO2:  [90 %-95 %] 94 %  Weight:   [unfilled]    Intake/Output last 3 shifts  I/O last 3 completed shifts:  In: 1140 [P.O.:1140]  Out: 2478 [Urine:2400; Chest Tube:78]  Intake/Output this shift:  No intake/output data recorded.      Physical Exam  No air leak and minimal chest tube output    Pertinent Labs   Lab Results   Component Value Date    WBC 13.1 (H) 08/12/2022    HGB 11.9 08/12/2022    HCT 37.4 08/12/2022    MCV 89 08/12/2022     (H) 08/12/2022             Pertinent Radiology     [unfilled]        Serg Alvarez MD

## 2022-08-15 NOTE — SUMMARY OF CARE
Chest tubes to suction. No air leak noted. On-Q pump dry but intact. Weaned to 1L NC to maintain O2 above 92% per order. Tylenol for pain. Using BSC. When pt awake, frequently using IS- 500mL reached. Pt slept most of night.

## 2022-08-15 NOTE — PLAN OF CARE
Problem: Plan of Care - These are the overarching goals to be used throughout the patient stay.    Goal: Readiness for Transition of Care  Outcome: Ongoing, Progressing  Flowsheets (Taken 8/15/2022 1133)  Barriers to Discharge: chest tubes removed today     Problem: Pain Acute  Goal: Acceptable Pain Control and Functional Ability  Outcome: Ongoing, Progressing  Intervention: Prevent or Manage Pain  Recent Flowsheet Documentation  Taken 8/15/2022 0800 by Spring Solis RN  Medication Review/Management: medications reviewed     Problem: Gas Exchange Impaired  Goal: Optimal Gas Exchange  Outcome: Ongoing, Progressing  Intervention: Optimize Oxygenation and Ventilation  Recent Flowsheet Documentation  Taken 8/15/2022 0800 by Spring Solis RN  Head of Bed (HOB) Positioning: HOB at 20-30 degrees   Goal Outcome Evaluation:             Pt remains on IV antibiotics,but had chest tubes removed by  this morning.  Waiting for follow up chest x-ray after removal.  Pt denied pain this morning.  After chest tube removal, pt's O2 sat 85-86% on room air.

## 2022-08-15 NOTE — PROGRESS NOTES
Patient has been assessed for Home Oxygen needs.     Pulse oximetry (SpO2) and Oxygen flow readings:    SpO2 = 86% on room air at rest while awake.    SpO2 improved to 92% on 1 liters/minute at rest.    SpO2 = 83% on room air during activity/with exercise.    *SpO2 improved to 91% on 2 liters/minute during activity/with exercise.

## 2022-08-15 NOTE — DISCHARGE SUMMARY
[facility] MEDICINE  DISCHARGE SUMMARY     Primary Care Physician: No Ref-Primary, Physician  Admission Date: 8/10/2022   Discharge Provider: Silvana Saavedra MD Discharge Date: 8/15/2022   Diet:   Active Diet and Nourishment Order   Procedures     Regular Diet Adult     Diet       Code Status: Full Code   Activity: DCACTIVITY: Activity as tolerated        Condition at Discharge: Stable     REASON FOR PRESENTATION(See Admission Note for Details)     SOB, RLL PNA, Empyema    PRINCIPAL & ACTIVE DISCHARGE DIAGNOSES     Active Problems:    Empyema (H)    HCAP (healthcare-associated pneumonia)    Failure of outpatient treatment      PENDING LABS     Unresulted Labs Ordered in the Past 30 Days of this Admission     Date and Time Order Name Status Description    8/12/2022 10:04 AM Fungal or Yeast Culture Routine Preliminary     8/12/2022 10:04 AM Anaerobic Bacterial Culture Routine Preliminary     8/11/2022 10:10 AM Pleural fluid Aerobic Bacterial Culture Routine with Gram Stain Preliminary     8/10/2022  6:14 PM Blood Culture Peripheral Blood Preliminary     8/10/2022  6:14 PM Blood Culture Hand, Left Preliminary     8/10/2022  1:08 PM Respiratory Aerobic Bacterial Culture with Gram Stain Preliminary     8/10/2022 11:44 AM Blood Culture Peripheral Blood Preliminary     8/10/2022 11:44 AM Blood Culture Peripheral Blood Preliminary             PROCEDURES ( this hospitalization only)      Procedure(s):  THORACOSCOPY WITH DECORTICATION    RECOMMENDATIONS TO OUTPATIENT PROVIDER FOR F/U VISIT     Follow-up Appointments     Follow-up and recommended labs and tests       Follow up with primary care provider, Physician No Ref-Primary, within 7   days for hospital follow- up.  The following labs/tests are recommended:   CBC, BMP.    Follow up with Dr. Ramsey in 2 weeks    Follow up with  from Pulmonology if needed. Please call on   [phone] to make appt.             PCP to assess  for on-going home o2 requirement as out-pt.    DISPOSITION     Home    SUMMARY OF HOSPITAL COURSE:      56F with R sided PNA and loculated R pleural effusion with gas bubbles  s/p right thoracentesis and thorascopic decortication for empyema.     Assessment/Plan     #Loculated pleural effusion  #RLL PNA with empyema -   -S/p R thoracentesis and R thoracoscopic decortication  -Cultures and path pending.  Sputum culture from 08/13 with GNR.  -Cefepime+flagyl while here, will switch to Levaquin X 28 days on discharge. Discussed with  on day of discharge.  -Needs supplemental o2 on discharge, qualifies at rest and activity.   -Follow up with  in 2 weeks.       #tobacco abuse- smoking cessation , counseled on discharge    #incidental pulm nodules - pulm f/u in clinic, details provided.    #acute pain - controlled currently.    #acute hypoxic respiratory failure - due to #1.  Home o2 eval done and she qualifies for home o2 at rest and with activity.     Patient stable to be discharged home today. Please refer to discharge medications and instructions for more details.        Discharge Medications with Med changes:     Current Discharge Medication List      CONTINUE these medications which have CHANGED    Details   levofloxacin (LEVAQUIN) 750 MG tablet Take 1 tablet (750 mg) by mouth daily for 28 days  Qty: 26 tablet, Refills: 0    Associated Diagnoses: Empyema (H); Failure of outpatient treatment; Pneumonia of right lower lobe due to infectious organism                   Rationale for medication changes:      See med rec        Consults       PULMONARY IP CONSULT  SURGERY GENERAL IP CONSULT  PHARMACY TO DOSE VANCO    Immunizations given this encounter       There is no immunization history on file for this patient.        Anticoagulation Information      Recent INR results:   Recent Labs   Lab 08/11/22  0646   INR 1.14     Warfarin doses (if applicable) or name of other anticoagulant:  N/A      SIGNIFICANT IMAGING FINDINGS     Results for orders placed or performed during the hospital encounter of 08/10/22   US Thoracentesis    Impression    IMPRESSION:    1. Status post right ultrasound-guided thoracentesis.     2. Only a tiny amount of fluid was obtained secondary to fluid complexity.    Reference CPT Code: 85898     XR Chest Port 1 View    Impression    IMPRESSION: Status post right thoracoscopy with decortication. Low lung volumes. Interval placement of two large bore right chest tubes with the tips projecting over the right lung apex. No pneumothorax. Opacification of the right mid to lower hemithorax   likely reflecting a combination of pleural fluid and adjacent atelectasis/infiltrates. The left lung is clear. Normal heart size.   XR Chest Port 1 View    Impression    IMPRESSION: The cardiac silhouette is unchanged in size and contour. The 3 right-sided chest tubes are unchanged in position. A small subpulmonic hydropneumothorax is noted, similar to prior exam. There is slight improved aeration of the right lung base.   The left lung is clear.   XR Chest Port 1 View    Impression    IMPRESSION: Unchanged small right pleural effusion, which contains gas at its inferior margin, similar to prior. There are 3 right thoracostomy tubes, which are in unchanged position.    Persistent right basilar atelectasis and/or consolidation.    There is mild left basilar atelectasis and a trace left pleural effusion. No left pneumothorax.    Cardiomediastinal silhouette is normal.   XR Chest Port 1 View    Impression    IMPRESSION:   1. Persistent small right pleural effusion with foci of gas inferiorly, unchanged  2. Unchanged positioning of the 3 right-sided chest tubes   3. Left basilar atelectasis and trace left effusion, similar to prior study   XR Chest 2 Views    Impression    IMPRESSION:     All 3 right chest tubes were discontinued. No apical pleural line to indicate pneumothorax. Opacity in the  right lateral costophrenic sulcus and azygoesophageal recess consistent with minimal residual pleural fluid, pleural thickening and/or atelectasis.   The extent of right lower lobe and lateral segment right middle lobe volume loss has not increased. The left lung remains well-expanded without focal opacity.    Right atrial heart border is silhouetted. Left ventricular and upper mediastinal interfaces are normal.       SIGNIFICANT LABORATORY FINDINGS       Discharge Orders        Reason for your hospital stay    Right lobe pneumonia, empyema     Activity    Your activity upon discharge: activity as tolerated     Follow-up and recommended labs and tests     Follow up with primary care provider, Physician No Ref-Primary, within 7 days for hospital follow- up.  The following labs/tests are recommended: CBC, BMP.    Follow up with Dr. Ramsey in 2 weeks    Follow up with  from Pulmonology if needed. Please call on [phone] to make appt.     Oxygen Adult/Peds    Oxygen Documentation:   I certify that this patient, Elaina Charles has been under my care (or a nurse practitioner or physican's assistant working with me). This is the face-to-face encounter for oxygen medical necessity.      Elaina Charles is now in a chronic stable state and continues to require supplemental oxygen. Patient has continued oxygen desaturation due to Pneumonia with empyema.    Alternative treatment(s) tried or considered and deemed clinically infective for treatment of PNA include antibiotics.  If portability is ordered, is the patient mobile within the home? yes    **Patients who qualify for home O2 coverage under the CMS guidelines require ABG tests or O2 sat readings obtained closest to, but no earlier than 2 days prior to the discharge, as evidence of the need for home oxygen therapy. Testing must be performed while patient is in the chronic stable state. See notes for O2 sats.**     Diet    Follow this diet upon discharge: Orders  Placed This Encounter      Regular Diet Adult       Examination   Physical Exam   Temp:  [98.2  F (36.8  C)-98.8  F (37.1  C)] 98.8  F (37.1  C)  Pulse:  [70-75] 71  Resp:  [17-18] 18  BP: (105-127)/(57-62) 112/59  SpO2:  [86 %-95 %] 95 %  Wt Readings from Last 1 Encounters:   08/12/22 58.5 kg (129 lb)       General: Pleasant, NAD  HEENT:EOMI, AT,NC  CVS:RRR, no edema  RS:CTAB  Abd: Soft, NT,ND  Neurology:Grossly normal  Psy:Approrpiate affect        Please see EMR for more detailed significant labs, imaging, consultant notes etc.    ISilvana MD, personally saw the patient today and spent greater than 30 minutes discharging this patient.    Silvana Saavedra MD  [facility]    CC:No Ref-Primary, Physician

## 2022-08-16 LAB
BACTERIA SPT CULT: ABNORMAL
GRAM STAIN RESULT: ABNORMAL

## 2022-08-17 NOTE — SIGNIFICANT EVENT
Significant Event Note    Time of event: 11:15 AM August 17, 2022    Description of event:  Informed by Microbiology that pleural cavity fluid sent on 8/11 grows gram positive cocci in the broth only.     Plan:  Patient was discharged on Levaquin for 28 days. Will wait for the final culture result for the identity of the organism and sensitivity test.          Carlos Amin MD  [employer]-covering hospitalist

## 2022-08-19 LAB
BACTERIA FLD CULT: NORMAL
BACTERIA PLR CULT: ABNORMAL
GRAM STAIN RESULT: ABNORMAL
GRAM STAIN RESULT: ABNORMAL

## 2022-08-23 ENCOUNTER — OFFICE VISIT (OUTPATIENT)
Dept: FAMILY MEDICINE | Facility: CLINIC | Age: 57
End: 2022-08-23
Payer: COMMERCIAL

## 2022-08-23 VITALS
HEART RATE: 62 BPM | DIASTOLIC BLOOD PRESSURE: 78 MMHG | WEIGHT: 132.9 LBS | HEIGHT: 62 IN | OXYGEN SATURATION: 97 % | SYSTOLIC BLOOD PRESSURE: 133 MMHG | BODY MASS INDEX: 24.46 KG/M2 | TEMPERATURE: 98.9 F

## 2022-08-23 DIAGNOSIS — J18.9 HCAP (HEALTHCARE-ASSOCIATED PNEUMONIA): Primary | ICD-10-CM

## 2022-08-23 LAB
ALBUMIN SERPL BCG-MCNC: 3.4 G/DL (ref 3.5–5.2)
ALP SERPL-CCNC: 85 U/L (ref 35–104)
ALT SERPL W P-5'-P-CCNC: 15 U/L (ref 10–35)
ANION GAP SERPL CALCULATED.3IONS-SCNC: 10 MMOL/L (ref 7–15)
AST SERPL W P-5'-P-CCNC: 27 U/L (ref 10–35)
BASOPHILS # BLD AUTO: 0.1 10E3/UL (ref 0–0.2)
BASOPHILS NFR BLD AUTO: 1 %
BILIRUB SERPL-MCNC: <0.2 MG/DL
BUN SERPL-MCNC: 11.7 MG/DL (ref 6–20)
CALCIUM SERPL-MCNC: 9.4 MG/DL (ref 8.6–10)
CHLORIDE SERPL-SCNC: 102 MMOL/L (ref 98–107)
CREAT SERPL-MCNC: 0.55 MG/DL (ref 0.51–0.95)
DEPRECATED HCO3 PLAS-SCNC: 26 MMOL/L (ref 22–29)
EOSINOPHIL # BLD AUTO: 0.2 10E3/UL (ref 0–0.7)
EOSINOPHIL NFR BLD AUTO: 2 %
ERYTHROCYTE [DISTWIDTH] IN BLOOD BY AUTOMATED COUNT: 15.4 % (ref 10–15)
GFR SERPL CREATININE-BSD FRML MDRD: >90 ML/MIN/1.73M2
GLUCOSE SERPL-MCNC: 107 MG/DL (ref 70–99)
HCT VFR BLD AUTO: 37.1 % (ref 35–47)
HGB BLD-MCNC: 11.7 G/DL (ref 11.7–15.7)
IMM GRANULOCYTES # BLD: 0 10E3/UL
IMM GRANULOCYTES NFR BLD: 0 %
LYMPHOCYTES # BLD AUTO: 2.3 10E3/UL (ref 0.8–5.3)
LYMPHOCYTES NFR BLD AUTO: 22 %
MCH RBC QN AUTO: 28.1 PG (ref 26.5–33)
MCHC RBC AUTO-ENTMCNC: 31.5 G/DL (ref 31.5–36.5)
MCV RBC AUTO: 89 FL (ref 78–100)
MONOCYTES # BLD AUTO: 0.7 10E3/UL (ref 0–1.3)
MONOCYTES NFR BLD AUTO: 7 %
NEUTROPHILS # BLD AUTO: 7.2 10E3/UL (ref 1.6–8.3)
NEUTROPHILS NFR BLD AUTO: 69 %
PLATELET # BLD AUTO: 691 10E3/UL (ref 150–450)
POTASSIUM SERPL-SCNC: 4.1 MMOL/L (ref 3.4–5.3)
PROT SERPL-MCNC: 7.1 G/DL (ref 6.4–8.3)
RBC # BLD AUTO: 4.16 10E6/UL (ref 3.8–5.2)
SODIUM SERPL-SCNC: 138 MMOL/L (ref 136–145)
WBC # BLD AUTO: 10.4 10E3/UL (ref 4–11)

## 2022-08-23 PROCEDURE — 99213 OFFICE O/P EST LOW 20 MIN: CPT | Performed by: FAMILY MEDICINE

## 2022-08-23 PROCEDURE — 36415 COLL VENOUS BLD VENIPUNCTURE: CPT | Performed by: FAMILY MEDICINE

## 2022-08-23 PROCEDURE — 80053 COMPREHEN METABOLIC PANEL: CPT | Performed by: FAMILY MEDICINE

## 2022-08-23 PROCEDURE — 85025 COMPLETE CBC W/AUTO DIFF WBC: CPT | Mod: QW | Performed by: FAMILY MEDICINE

## 2022-08-23 ASSESSMENT — LIFESTYLE VARIABLES
HOW OFTEN DO YOU HAVE SIX OR MORE DRINKS ON ONE OCCASION: NEVER
HOW MANY STANDARD DRINKS CONTAINING ALCOHOL DO YOU HAVE ON A TYPICAL DAY: PATIENT DOES NOT DRINK

## 2022-08-23 NOTE — LETTER
August 23, 2022      Elaina Charles  [address] [place] [address] [address] [place] [address]        Dear ,    We are writing to inform you of your test results.    Test results indicate you may require additional follow up, see comment below.  Your platelet count is elevated please recheck in 1 month    Resulted Orders   Comprehensive metabolic panel   Result Value Ref Range    Sodium 138 136 - 145 mmol/L    Potassium 4.1 3.4 - 5.3 mmol/L    Creatinine 0.55 0.51 - 0.95 mg/dL    Urea Nitrogen 11.7 6.0 - 20.0 mg/dL    Chloride 102 98 - 107 mmol/L    Carbon Dioxide (CO2) 26 22 - 29 mmol/L    Anion Gap 10 7 - 15 mmol/L    Glucose 107 (H) 70 - 99 mg/dL    Calcium 9.4 8.6 - 10.0 mg/dL    Protein Total 7.1 6.4 - 8.3 g/dL    Albumin 3.4 (L) 3.5 - 5.2 g/dL    Bilirubin Total <0.2 <=1.2 mg/dL    Alkaline Phosphatase 85 35 - 104 U/L    AST 27 10 - 35 U/L    ALT 15 10 - 35 U/L    GFR Estimate >90 >60 mL/min/1.73m2      Comment:      Effective December 21, 2021 eGFRcr in adults is calculated using the 2021 CKD-EPI creatinine equation which includes age and gender (Janina et al., NEJM, DOI: 10.1056/TFTUzq9908923)   CBC with platelets and differential   Result Value Ref Range    WBC Count 10.4 4.0 - 11.0 10e3/uL    RBC Count 4.16 3.80 - 5.20 10e6/uL    Hemoglobin 11.7 11.7 - 15.7 g/dL    Hematocrit 37.1 35.0 - 47.0 %    MCV 89 78 - 100 fL    MCH 28.1 26.5 - 33.0 pg    MCHC 31.5 31.5 - 36.5 g/dL    RDW 15.4 (H) 10.0 - 15.0 %    Platelet Count 691 (H) 150 - 450 10e3/uL    % Neutrophils 69 %    % Lymphocytes 22 %    % Monocytes 7 %    % Eosinophils 2 %    % Basophils 1 %    % Immature Granulocytes 0 %    Absolute Neutrophils 7.2 1.6 - 8.3 10e3/uL    Absolute Lymphocytes 2.3 0.8 - 5.3 10e3/uL    Absolute Monocytes 0.7 0.0 - 1.3 10e3/uL    Absolute Eosinophils 0.2 0.0 - 0.7 10e3/uL    Absolute Basophils 0.1 0.0 - 0.2 10e3/uL    Absolute Immature Granulocytes 0.0 <=0.4 10e3/uL       If you have any questions or concerns, please call the clinic at  the number listed above.       Sincerely,      Kota Mitchell MD

## 2022-08-23 NOTE — LETTER
August 23, 2022      Elaina LAL Jaleesatravis  [address] [place] [address]        To Whom It May Concern:    Elaina Charles was seen in our clinic. She may return to work without restrictions.      Sincerely,        Kota Mitchell MD

## 2022-08-23 NOTE — PROGRESS NOTES
Assessment & Plan     HCAP (healthcare-associated pneumonia)  Overall patient is doing well she is off her oxygen now.  She is due for labs today.  She has follow-up with her surgeon next week.  She will be on her Levaquin for a month.  She is still smoking but does not like Chantix as she is smoking a lot less and she thinks she can do it on her own.  - CBC with Platelets & Differential  - Comprehensive metabolic panel             Nicotine/Tobacco Cessation:  She reports that she has been smoking. She has never used smokeless tobacco.  Nicotine/Tobacco Cessation Plan:   Information offered: Patient not interested at this time          No follow-ups on file.    Kota Mitchell MD  [facility]    Ham Delaney is a 57 year old accompanied by her self, presenting for the following health issues:  Hospital F/U (Hospital follow up--was at [facility] for empyema.  Is doing better, was sent home w/ O2 but hasn't been needing to use it.)      [facility]       Hospital Follow-up Visit:    Hospital/Nursing Home/IP Rehab Facility: [facility]  Date of Admission: 8/10/2022  Date of Discharge: 8/12/2022  Reason(s) for Admission: empyema/pneumonia    Was your hospitalization related to COVID-19? No   Problems taking medications regularly:  None  Medication changes since discharge: now taking levofloxacin 750 mg daily x 28 days  Problems adhering to non-medication therapy:  None    Summary of hospitalization:  [facility] discharge summary reviewed  Diagnostic Tests/Treatments reviewed.  Follow up needed: Needs follow-up labs today  Other Healthcare Providers Involved in Patient s Care:         Surgical follow-up appointment - Next week  Update since discharge: improved.         Post Medication Reconciliation Status:  Updated      Plan of care communicated with patient                   Review of Systems   Constitutional, HEENT, cardiovascular, pulmonary, gi  "and gu systems are negative, except as otherwise noted.      Objective    /78 (BP Location: Right arm, Patient Position: Sitting, Cuff Size: Adult Regular)   Pulse 62   Temp 98.9  F (37.2  C) (Tympanic)   Ht 1.575 m (5' 2\")   Wt 60.3 kg (132 lb 14.4 oz)   SpO2 97%   BMI 24.31 kg/m    Body mass index is 24.31 kg/m .  Physical Exam   GENERAL: healthy, alert and no distress  NECK: no adenopathy, no asymmetry, masses, or scars and thyroid normal to palpation  RESP: lungs clear to auscultation - no rales, rhonchi or wheezes  CV: regular rate and rhythm, normal S1 S2, no S3 or S4, no murmur, click or rub, no peripheral edema and peripheral pulses strong  MS: no gross musculoskeletal defects noted, no edema                    .  ..  "

## 2022-08-25 LAB
BACTERIA FLD CULT: ABNORMAL
BACTERIA FLD CULT: ABNORMAL

## 2022-09-09 LAB — BACTERIA FLD CULT: NO GROWTH

## 2022-09-26 DIAGNOSIS — F17.219 CIGARETTE NICOTINE DEPENDENCE WITH NICOTINE-INDUCED DISORDER: Primary | ICD-10-CM

## 2023-08-31 NOTE — OP NOTE
OPERATIVE REPORT    Elaina Charles   [facility]  Medical Record #:  [id number]  YOB: 1965  Age:  56 year old    PROCEDURE DATE:  8/10/2022 - 8/12/2022    PREOPERATIVE DIAGNOSIS: Empyema right thorax with pneumonia    POSTOPERATIVE DIAGNOSIS: Same    PROCEDURE: 1.  Placement of On-Q pain pump 2.  Right thoracoscopy complete the decortication visceral parietal pleural surfaces interlobar surfaces diaphragm and mediastinum 3.  Flexible bronchoscopy    OPERATING SURGEON:  Serg Alvarez MD    ASSISTANT: Technician    ANESTHESIA: General    DESCRIPTION OF PROCEDURE: With the patient in supine position under general anesthesia having reviewed the risk benefits and complications of surgical intervention with her and the potential extent of surgical intervention in light of her almost 2-month history of illness with now a right complex pleural fluid process the right chest is prepped and draped in usual sterile fashion after bronchoscopy and placement of a double-lumen endotracheal tube.  Bronchoscopy reveals absence of disease in the trachea mainstem or segmental bronchial segments.  Patient turned to the left lateral position right chest prepped in usual sterile fashion.  Using blunt dissection the right thorax is entered and a thoracoscope introduced and complete decortication is accomplished to have all pleural surfaces visceral parietal pleural surfaces with significant parietal peritoneum removed.  Cultures of debris and fluid are undertaken for gram stain aerobic anaerobic and fungal cultures being ordered.  Complete decortication is completed irrigation with warm irrigation and solution and in piecemeal fashion all surfaces were freed of thick purulent material.  Lung is reinflated after placement of 332 Cymro chest tubes 1 on the diaphragm into posterolaterally.  On-Q pain pump is placed through separate entrance to cover all chest tube sites.  Time was taken to ensure hemostasis is  obtained remainder of air and fluid removed from the right chest and procedure terminated with closure of skin wounds with interrupted 4-0 Vicryl subcuticular sutures.  Chest tubes are secured with 0 silk sutures.  The estimated blood loss is minimal there were no complications and the patient tolerated the procedure well.  Sponge and needle counts are correct x2.    EBL:  [unfilled]    SPECIMENS:    ID Type Source Tests Collected by Time Destination   1 : RIGHT LUNG TISSUE  Tissue Lung, Right SURGICAL PATHOLOGY EXAM Serg Carrillo MD 8/12/2022 10:14 AM    A : Right Lung Culture - erobic, anerobic, gram stain, fungal Tissue Lung, Right ANAEROBIC BACTERIAL CULTURE ROUTINE, GRAM STAIN, FUNGAL OR YEAST CULTURE ROUTINE, AEROBIC BACTERIAL CULTURE ROUTINE Serg Carrillo MD 8/12/2022 10:02 AM        Serg carrillo md  [facility]   Consent 3/Introductory Paragraph: I gave the patient a chance to ask questions they had about the procedure.  Following this I explained the Mohs procedure and consent was obtained. The risks, benefits and alternatives to therapy were discussed in detail. Specifically, the risks of infection, scarring, bleeding, prolonged wound healing, incomplete removal, allergy to anesthesia, nerve injury and recurrence were addressed. Prior to the procedure, the treatment site was clearly identified and confirmed by the patient. All components of Universal Protocol/PAUSE Rule completed.

## (undated) DEVICE — SUCTION TIP POOLE STERILE 35040

## (undated) DEVICE — DRSG GAUZE 4X4" 3033

## (undated) DEVICE — SYSTEM LAPAROVUE VISIBILITY LAPVUE10

## (undated) DEVICE — SOL WATER IRRIG 1000ML BOTTLE 2F7114

## (undated) DEVICE — SOL NACL 0.9% IRRIG 1000ML BOTTLE 2F7124

## (undated) DEVICE — Device

## (undated) DEVICE — SUTURE VICRYL+ 3-0 18IN PS-2 UND VCP497H

## (undated) DEVICE — SUTURE VICRYL+ 2-0 27IN CT-1 UND VCP259H

## (undated) DEVICE — SUCTION DRY CHEST DRAIN OASIS 3600-100

## (undated) DEVICE — SOL RINGERS LACTATED 1000ML BAG 2B2324X

## (undated) DEVICE — DRAPE IOBAN INCISE 23X17" 6650EZ

## (undated) DEVICE — PLATE GROUNDING ADULT W/CORD 9165L

## (undated) DEVICE — ENDO TROCAR SHIELDED BLADED KII Z-THRD 11X100MM CTB33

## (undated) DEVICE — TUBING SMOKE EVAC PNEUMOCLEAR HIGH FLOW 0620050250

## (undated) DEVICE — ENDO DISSECTOR BLUNT 05MM  BTD05

## (undated) DEVICE — CATH IV ANGIO INTRO 14GA X 1 3/4" 381467

## (undated) DEVICE — ESU CORD MONOPOLAR 10'  E0510

## (undated) DEVICE — TUBING LAP/SUCT IRRIG DAVOL 0026880

## (undated) DEVICE — TUBING SUCTION MEDI-VAC 1/4"X20' N620A - HE

## (undated) DEVICE — TUBING SUCTION DRAINAGE PLEURAL DUAL 8884714200

## (undated) DEVICE — GLOVE BIOGEL PI ULTRATOUCH G SZ 7.5 42175

## (undated) DEVICE — GOWN IMPERVIOUS BREATHABLE SMART LG 89015

## (undated) DEVICE — ENDO TROCAR SLEEVE KII Z-THREADED 11X100MM CTS12

## (undated) DEVICE — MAT INST MAGNETIC 16X20

## (undated) DEVICE — PREP CHLORAPREP 26ML TINTED HI-LITE ORANGE 930815

## (undated) DEVICE — ESU PENCIL SMOKE EVAC W/ROCKER SWITCH 0703-047-000

## (undated) DEVICE — SUTURE SILK 0 PSL 580H

## (undated) DEVICE — CATH THORACIC RT ANGLE CLOTSTOP 32FR

## (undated) DEVICE — CATH THORACIC STRAIGHT CLOTSTOP 32FR

## (undated) DEVICE — SU VICRYL+ 0 27IN CT-2 UND VCP270H

## (undated) DEVICE — HLSTR JET MULTI-INST SFTY STRL FIRE-SFE LF 7212

## (undated) DEVICE — CUSTOM PACK LAP CHOLE SBA5BLCHEA

## (undated) DEVICE — DRSG DRAIN 4X4" 7086

## (undated) DEVICE — CONNECTOR 5 TO 1 STRL 271502

## (undated) RX ORDER — FENTANYL CITRATE 50 UG/ML
INJECTION, SOLUTION INTRAMUSCULAR; INTRAVENOUS
Status: DISPENSED
Start: 2022-08-12

## (undated) RX ORDER — FENTANYL CITRATE-0.9 % NACL/PF 10 MCG/ML
PLASTIC BAG, INJECTION (ML) INTRAVENOUS
Status: DISPENSED
Start: 2022-08-12

## (undated) RX ORDER — LIDOCAINE HYDROCHLORIDE 10 MG/ML
INJECTION, SOLUTION EPIDURAL; INFILTRATION; INTRACAUDAL; PERINEURAL
Status: DISPENSED
Start: 2022-08-12

## (undated) RX ORDER — PROPOFOL 10 MG/ML
INJECTION, EMULSION INTRAVENOUS
Status: DISPENSED
Start: 2022-08-12

## (undated) RX ORDER — GINSENG 100 MG
CAPSULE ORAL
Status: DISPENSED
Start: 2022-08-12

## (undated) RX ORDER — DEXAMETHASONE SODIUM PHOSPHATE 10 MG/ML
INJECTION INTRAMUSCULAR; INTRAVENOUS
Status: DISPENSED
Start: 2022-08-12

## (undated) RX ORDER — KETAMINE HYDROCHLORIDE 10 MG/ML
INJECTION INTRAMUSCULAR; INTRAVENOUS
Status: DISPENSED
Start: 2022-08-12

## (undated) RX ORDER — LIDOCAINE HYDROCHLORIDE 10 MG/ML
INJECTION, SOLUTION INFILTRATION; PERINEURAL
Status: DISPENSED
Start: 2022-08-11